# Patient Record
Sex: FEMALE | Race: WHITE | Employment: OTHER | URBAN - METROPOLITAN AREA
[De-identification: names, ages, dates, MRNs, and addresses within clinical notes are randomized per-mention and may not be internally consistent; named-entity substitution may affect disease eponyms.]

---

## 2017-03-27 RX ORDER — VALSARTAN 160 MG/1
TABLET ORAL
Qty: 90 TABLET | Refills: 2 | Status: SHIPPED | OUTPATIENT
Start: 2017-03-27 | End: 2017-03-31

## 2017-03-27 RX ORDER — HYDROCHLOROTHIAZIDE 25 MG/1
TABLET ORAL
Qty: 90 TABLET | Refills: 2 | Status: SHIPPED | OUTPATIENT
Start: 2017-03-27 | End: 2017-03-31

## 2017-03-27 RX ORDER — ATORVASTATIN CALCIUM 20 MG/1
TABLET, FILM COATED ORAL
Qty: 90 TABLET | Refills: 2 | Status: SHIPPED | OUTPATIENT
Start: 2017-03-27 | End: 2017-03-31

## 2017-03-31 ENCOUNTER — OFFICE VISIT (OUTPATIENT)
Dept: FAMILY MEDICINE CLINIC | Age: 77
End: 2017-03-31

## 2017-03-31 VITALS
OXYGEN SATURATION: 98 % | WEIGHT: 198 LBS | BODY MASS INDEX: 32.95 KG/M2 | HEART RATE: 78 BPM | DIASTOLIC BLOOD PRESSURE: 88 MMHG | SYSTOLIC BLOOD PRESSURE: 136 MMHG

## 2017-03-31 DIAGNOSIS — J30.1 SEASONAL ALLERGIC RHINITIS DUE TO POLLEN: ICD-10-CM

## 2017-03-31 DIAGNOSIS — F41.9 ANXIETY: ICD-10-CM

## 2017-03-31 DIAGNOSIS — J45.40 ASTHMA, MODERATE PERSISTENT, WELL-CONTROLLED: ICD-10-CM

## 2017-03-31 DIAGNOSIS — E78.00 HYPERCHOLESTEROLEMIA: ICD-10-CM

## 2017-03-31 DIAGNOSIS — Z91.81 AT RISK FOR FALLING: ICD-10-CM

## 2017-03-31 DIAGNOSIS — Z78.0 POSTMENOPAUSAL: ICD-10-CM

## 2017-03-31 DIAGNOSIS — E03.9 ACQUIRED HYPOTHYROIDISM: ICD-10-CM

## 2017-03-31 DIAGNOSIS — I10 ESSENTIAL HYPERTENSION: Primary | ICD-10-CM

## 2017-03-31 PROCEDURE — 99214 OFFICE O/P EST MOD 30 MIN: CPT | Performed by: FAMILY MEDICINE

## 2017-03-31 RX ORDER — FLUTICASONE PROPIONATE 50 MCG
SPRAY, SUSPENSION (ML) NASAL
Qty: 1 BOTTLE | Refills: 11 | Status: SHIPPED | OUTPATIENT
Start: 2017-03-31 | End: 2018-10-05 | Stop reason: SDUPTHER

## 2017-03-31 RX ORDER — CITALOPRAM 40 MG/1
TABLET ORAL
Qty: 90 TABLET | Refills: 3 | Status: SHIPPED | OUTPATIENT
Start: 2017-03-31 | End: 2018-10-05

## 2017-03-31 RX ORDER — LEVOTHYROXINE SODIUM 0.07 MG/1
TABLET ORAL
Qty: 90 TABLET | Refills: 3 | Status: SHIPPED | OUTPATIENT
Start: 2017-03-31 | End: 2018-04-19 | Stop reason: SDUPTHER

## 2017-03-31 RX ORDER — ALBUTEROL SULFATE 90 UG/1
2 AEROSOL, METERED RESPIRATORY (INHALATION) EVERY 6 HOURS PRN
Qty: 1 INHALER | Refills: 11 | Status: SHIPPED | OUTPATIENT
Start: 2017-03-31 | End: 2018-10-05 | Stop reason: SDUPTHER

## 2017-03-31 RX ORDER — ZOLPIDEM TARTRATE 10 MG/1
10 TABLET ORAL NIGHTLY PRN
Qty: 30 TABLET | Refills: 5 | Status: SHIPPED | OUTPATIENT
Start: 2017-03-31 | End: 2017-12-08

## 2017-04-22 ENCOUNTER — HOSPITAL ENCOUNTER (OUTPATIENT)
Dept: OTHER | Age: 77
Discharge: HOME | End: 2017-04-22
Attending: FAMILY MEDICINE | Admitting: FAMILY MEDICINE

## 2017-04-22 DIAGNOSIS — I10 ESSENTIAL HYPERTENSION: ICD-10-CM

## 2017-04-22 DIAGNOSIS — E03.9 ACQUIRED HYPOTHYROIDISM: ICD-10-CM

## 2017-04-22 LAB
A/G RATIO: 1.5 (ref 1.1–2.2)
ALBUMIN SERPL-MCNC: 4.2 G/DL (ref 3.4–5)
ALP BLD-CCNC: 88 U/L (ref 40–129)
ALT SERPL-CCNC: 17 U/L (ref 10–40)
ANION GAP SERPL CALCULATED.3IONS-SCNC: 15 MMOL/L (ref 3–16)
AST SERPL-CCNC: 20 U/L (ref 15–37)
BILIRUB SERPL-MCNC: 0.6 MG/DL (ref 0–1)
BUN BLDV-MCNC: 14 MG/DL (ref 7–20)
CALCIUM SERPL-MCNC: 9.8 MG/DL (ref 8.3–10.6)
CHLORIDE BLD-SCNC: 100 MMOL/L (ref 99–110)
CHOLESTEROL, TOTAL: 175 MG/DL (ref 0–199)
CO2: 25 MMOL/L (ref 21–32)
CREAT SERPL-MCNC: 0.8 MG/DL (ref 0.6–1.2)
GFR AFRICAN AMERICAN: >60
GFR NON-AFRICAN AMERICAN: >60
GLOBULIN: 2.8 G/DL
GLUCOSE BLD-MCNC: 92 MG/DL (ref 70–99)
HDLC SERPL-MCNC: 61 MG/DL (ref 40–60)
LDL CHOLESTEROL CALCULATED: 97 MG/DL
POTASSIUM SERPL-SCNC: 3.6 MMOL/L (ref 3.5–5.1)
SODIUM BLD-SCNC: 140 MMOL/L (ref 136–145)
TOTAL PROTEIN: 7 G/DL (ref 6.4–8.2)
TRIGL SERPL-MCNC: 87 MG/DL (ref 0–150)
TSH REFLEX: 1.83 UIU/ML (ref 0.27–4.2)
VLDLC SERPL CALC-MCNC: 17 MG/DL

## 2017-05-16 RX ORDER — ATENOLOL 100 MG/1
TABLET ORAL
Qty: 90 TABLET | Refills: 2 | Status: SHIPPED | OUTPATIENT
Start: 2017-05-16 | End: 2017-12-08

## 2017-06-02 ENCOUNTER — HOSPITAL ENCOUNTER (OUTPATIENT)
Dept: GENERAL RADIOLOGY | Age: 77
Discharge: HOME | End: 2017-06-02
Attending: FAMILY MEDICINE | Admitting: FAMILY MEDICINE

## 2017-06-02 DIAGNOSIS — Z78.0 ASYMPTOMATIC MENOPAUSAL STATE: ICD-10-CM

## 2017-06-02 DIAGNOSIS — Z78.0 POSTMENOPAUSAL: ICD-10-CM

## 2017-06-07 ENCOUNTER — TELEPHONE (OUTPATIENT)
Dept: FAMILY MEDICINE CLINIC | Age: 77
End: 2017-06-07

## 2017-06-07 RX ORDER — ALENDRONATE SODIUM 70 MG/1
70 TABLET ORAL
Qty: 4 TABLET | Refills: 12 | Status: SHIPPED | OUTPATIENT
Start: 2017-06-07 | End: 2018-10-05

## 2017-06-21 ENCOUNTER — TELEPHONE (OUTPATIENT)
Dept: FAMILY MEDICINE CLINIC | Age: 77
End: 2017-06-21

## 2017-12-08 ENCOUNTER — TELEPHONE (OUTPATIENT)
Dept: FAMILY MEDICINE CLINIC | Age: 77
End: 2017-12-08

## 2017-12-08 ENCOUNTER — OFFICE VISIT (OUTPATIENT)
Dept: FAMILY MEDICINE CLINIC | Age: 77
End: 2017-12-08

## 2017-12-08 VITALS
WEIGHT: 199 LBS | DIASTOLIC BLOOD PRESSURE: 80 MMHG | HEART RATE: 80 BPM | BODY MASS INDEX: 33.12 KG/M2 | OXYGEN SATURATION: 97 % | SYSTOLIC BLOOD PRESSURE: 122 MMHG

## 2017-12-08 DIAGNOSIS — R44.0 AUDITORY HALLUCINATION: ICD-10-CM

## 2017-12-08 DIAGNOSIS — R41.0 DELIRIUM: Primary | ICD-10-CM

## 2017-12-08 PROBLEM — E66.9 OBESITY (BMI 30-39.9): Status: ACTIVE | Noted: 2017-12-08

## 2017-12-08 PROBLEM — G93.40 ACUTE ENCEPHALOPATHY: Status: ACTIVE | Noted: 2017-12-08

## 2017-12-08 PROCEDURE — G8484 FLU IMMUNIZE NO ADMIN: HCPCS | Performed by: FAMILY MEDICINE

## 2017-12-08 PROCEDURE — 1036F TOBACCO NON-USER: CPT | Performed by: FAMILY MEDICINE

## 2017-12-08 PROCEDURE — 1090F PRES/ABSN URINE INCON ASSESS: CPT | Performed by: FAMILY MEDICINE

## 2017-12-08 PROCEDURE — G8417 CALC BMI ABV UP PARAM F/U: HCPCS | Performed by: FAMILY MEDICINE

## 2017-12-08 PROCEDURE — 4040F PNEUMOC VAC/ADMIN/RCVD: CPT | Performed by: FAMILY MEDICINE

## 2017-12-08 PROCEDURE — G8399 PT W/DXA RESULTS DOCUMENT: HCPCS | Performed by: FAMILY MEDICINE

## 2017-12-08 PROCEDURE — 1123F ACP DISCUSS/DSCN MKR DOCD: CPT | Performed by: FAMILY MEDICINE

## 2017-12-08 PROCEDURE — 99214 OFFICE O/P EST MOD 30 MIN: CPT | Performed by: FAMILY MEDICINE

## 2017-12-08 PROCEDURE — G8428 CUR MEDS NOT DOCUMENT: HCPCS | Performed by: FAMILY MEDICINE

## 2017-12-08 RX ORDER — VALSARTAN 160 MG/1
160 TABLET ORAL DAILY
Qty: 90 TABLET | Refills: 3 | Status: SHIPPED | OUTPATIENT
Start: 2017-12-08 | End: 2018-08-01 | Stop reason: ALTCHOICE

## 2017-12-08 RX ORDER — ATENOLOL 50 MG/1
TABLET ORAL
COMMUNITY
Start: 2017-11-13 | End: 2017-12-08 | Stop reason: SDUPTHER

## 2017-12-08 RX ORDER — ATORVASTATIN CALCIUM 20 MG/1
20 TABLET, FILM COATED ORAL DAILY
Qty: 90 TABLET | Refills: 3 | Status: SHIPPED | OUTPATIENT
Start: 2017-12-08 | End: 2018-10-05 | Stop reason: SDUPTHER

## 2017-12-08 RX ORDER — HYDROCHLOROTHIAZIDE 25 MG/1
25 TABLET ORAL DAILY
Qty: 90 TABLET | Refills: 3 | Status: SHIPPED | OUTPATIENT
Start: 2017-12-08 | End: 2018-10-05 | Stop reason: SDUPTHER

## 2017-12-08 RX ORDER — ATENOLOL 50 MG/1
50 TABLET ORAL DAILY
Qty: 90 TABLET | Refills: 3 | Status: SHIPPED | OUTPATIENT
Start: 2017-12-08 | End: 2018-04-26 | Stop reason: SDUPTHER

## 2017-12-08 NOTE — PROGRESS NOTES
Subjective:      Patient ID: Erin Booth is a 69 y.o. female. HPI patient presents today for her annual check up, and to review medications. Pt here with grandson who reports past 3 weeks has been more confused, talking to people who aren't there, thinking people are out to get her. Last night at midnight she walked down the street to her son's house, said that someone called her to get out as they were going to \"smoke\" her. She says that 10 years ago the neighbors were listening in on her phone and they didn't like what she said so they are getting back at her now. Last night she says that her son was outside waiting for her but grandson reports they talked to him and he never talked to her. Grandson reports the other day pt asked him to come over as the neighbor wanted to see him but the neighbor she was talking about no longer lives there and the house is actually empty. Grandson reports has not been eating as well past few weeks. Pt denies any CP, abd pain, n/v. States sometimes feels constipated but dif to get exact history. No diarrhea. No dysuria. No fever. No cough. + chronic RN. States has been more sob but not taking inhalers reg (pt very non compliant with these chronically). Review of Systems    Objective:   Physical Exam  Vitals:    12/08/17 1148 12/08/17 1221   BP: 122/80    Site: Left Arm    Position: Sitting    Cuff Size: Large Adult    Pulse: 105 80   SpO2: 97%    Weight: 199 lb (90.3 kg)      Wt Readings from Last 3 Encounters:   12/08/17 199 lb (90.3 kg)   03/31/17 198 lb (89.8 kg)   09/27/16 202 lb 3.2 oz (91.7 kg)     Body mass index is 33.12 kg/m². Alert and oriented x 4 NAD, obese, well hydrated, well developed.   Knows month, day year and date  Knows here to discuss her mental status but states \"I;m not crazy\"  VERY tangential in stories  PERRL, EOMI  TM clear bilaterally  OP clear  No nodes neck  Lungs clear  CV rrr  abd soft, NT, ND  Non pitting edema R>L      Assessment: Fredy Baig was seen today for altered mental status. Diagnoses and all orders for this visit:    Delirium  Auditory hallucination    Concern with acute change in mental status  Attempt to get urine in office but pt missed the hat in the toilet  Discussed with hospitalist at [facility]  Will admit for further eval    Other orders  -     hydrochlorothiazide (HYDRODIURIL) 25 MG tablet; Take 1 tablet by mouth daily  -     atorvastatin (LIPITOR) 20 MG tablet; Take 1 tablet by mouth daily  -     valsartan (DIOVAN) 160 MG tablet; Take 1 tablet by mouth daily  -     atenolol (TENORMIN) 50 MG tablet;  Take 1 tablet by mouth daily

## 2017-12-10 PROBLEM — F20.9 SCHIZOPHRENIA (HCC): Status: ACTIVE | Noted: 2017-12-10

## 2017-12-11 ENCOUNTER — CARE COORDINATION (OUTPATIENT)
Dept: CASE MANAGEMENT | Age: 77
End: 2017-12-11

## 2017-12-11 NOTE — CARE COORDINATION
[facility] Transitions Initial Follow Up Call    Call within 2 business days of discharge: Yes    Patient: Sue Alvarado Patient : 1940   MRN: [id number]  Reason for Admission: Schizophrenia  Discharge Date: 12/10/17 RARS: Geisinger Risk Score: 11.5       Facility: [place] [facility] [address] attempted 24 hour discharge call, no answer and voice mail is not set up, unable to leave message. Attempted to reach family member on [phone], no answer. Left message for family member to return call. Follow Up  No future appointments.     Alicia Crocker, RN   Care Transition Coordinator  [phone]

## 2017-12-13 NOTE — CARE COORDINATION
[facility] Transitions Initial Follow Up Call    Call within 2 business days of discharge: Yes    Patient: Eric Castano Patient : 1940   MRN: [id number]  Reason for Admission: Schizophrenia  Discharge Date: 12/10/17 RARS: Geisinger Risk Score: 11.5    Facility: [facility] [address] services provided:  Obtained and reviewed discharge summary and/or continuity of care documents       CTC made 3rd and final attempt to reach patient for 24 hour follow up call. No answer,   Episode closed. Follow Up  No future appointments.     Jorge Carney RN

## 2018-04-19 RX ORDER — LEVOTHYROXINE SODIUM 0.07 MG/1
TABLET ORAL
Qty: 90 TABLET | Refills: 0 | Status: SHIPPED | OUTPATIENT
Start: 2018-04-19 | End: 2018-10-05

## 2018-04-26 RX ORDER — ATENOLOL 50 MG/1
50 TABLET ORAL DAILY
Qty: 90 TABLET | Refills: 3 | Status: SHIPPED | OUTPATIENT
Start: 2018-04-26 | End: 2019-05-07 | Stop reason: SDUPTHER

## 2018-08-01 ENCOUNTER — TELEPHONE (OUTPATIENT)
Dept: FAMILY MEDICINE CLINIC | Age: 78
End: 2018-08-01

## 2018-08-01 RX ORDER — TELMISARTAN 40 MG/1
40 TABLET ORAL DAILY
Qty: 90 TABLET | Refills: 3 | Status: SHIPPED | OUTPATIENT
Start: 2018-08-01 | End: 2019-08-04 | Stop reason: SDUPTHER

## 2018-08-01 NOTE — TELEPHONE ENCOUNTER
Pharmacy fax received, requesting a change in [facility] due to Valsartan being out of stock. Patient is not currently taking the recalled brand, but they are completely out of the medication. Please advise of alternative.

## 2018-10-05 ENCOUNTER — OFFICE VISIT (OUTPATIENT)
Dept: FAMILY MEDICINE CLINIC | Age: 78
End: 2018-10-05
Payer: MEDICARE

## 2018-10-05 VITALS
SYSTOLIC BLOOD PRESSURE: 112 MMHG | OXYGEN SATURATION: 97 % | HEART RATE: 68 BPM | BODY MASS INDEX: 28.84 KG/M2 | DIASTOLIC BLOOD PRESSURE: 80 MMHG | WEIGHT: 168 LBS

## 2018-10-05 DIAGNOSIS — E78.00 HYPERCHOLESTEROLEMIA: ICD-10-CM

## 2018-10-05 DIAGNOSIS — F20.81 SCHIZOPHRENIFORM DISORDER (HCC): ICD-10-CM

## 2018-10-05 DIAGNOSIS — E03.9 ACQUIRED HYPOTHYROIDISM: ICD-10-CM

## 2018-10-05 DIAGNOSIS — Z23 NEED FOR INFLUENZA VACCINATION: ICD-10-CM

## 2018-10-05 DIAGNOSIS — I10 ESSENTIAL HYPERTENSION: Primary | ICD-10-CM

## 2018-10-05 DIAGNOSIS — M85.851 OSTEOPENIA OF NECKS OF BOTH FEMURS: ICD-10-CM

## 2018-10-05 DIAGNOSIS — J45.40 ASTHMA, MODERATE PERSISTENT, WELL-CONTROLLED: ICD-10-CM

## 2018-10-05 DIAGNOSIS — L30.9 DERMATITIS: ICD-10-CM

## 2018-10-05 DIAGNOSIS — M85.852 OSTEOPENIA OF NECKS OF BOTH FEMURS: ICD-10-CM

## 2018-10-05 PROBLEM — F20.9 SCHIZOPHRENIA (HCC): Status: RESOLVED | Noted: 2017-12-10 | Resolved: 2018-10-05

## 2018-10-05 PROBLEM — G93.40 ACUTE ENCEPHALOPATHY: Status: RESOLVED | Noted: 2017-12-08 | Resolved: 2018-10-05

## 2018-10-05 PROCEDURE — 1123F ACP DISCUSS/DSCN MKR DOCD: CPT | Performed by: FAMILY MEDICINE

## 2018-10-05 PROCEDURE — G8399 PT W/DXA RESULTS DOCUMENT: HCPCS | Performed by: FAMILY MEDICINE

## 2018-10-05 PROCEDURE — 99214 OFFICE O/P EST MOD 30 MIN: CPT | Performed by: FAMILY MEDICINE

## 2018-10-05 PROCEDURE — 1090F PRES/ABSN URINE INCON ASSESS: CPT | Performed by: FAMILY MEDICINE

## 2018-10-05 PROCEDURE — G8482 FLU IMMUNIZE ORDER/ADMIN: HCPCS | Performed by: FAMILY MEDICINE

## 2018-10-05 PROCEDURE — 90662 IIV NO PRSV INCREASED AG IM: CPT | Performed by: FAMILY MEDICINE

## 2018-10-05 PROCEDURE — G8427 DOCREV CUR MEDS BY ELIG CLIN: HCPCS | Performed by: FAMILY MEDICINE

## 2018-10-05 PROCEDURE — 4040F PNEUMOC VAC/ADMIN/RCVD: CPT | Performed by: FAMILY MEDICINE

## 2018-10-05 PROCEDURE — 1036F TOBACCO NON-USER: CPT | Performed by: FAMILY MEDICINE

## 2018-10-05 PROCEDURE — G0008 ADMIN INFLUENZA VIRUS VAC: HCPCS | Performed by: FAMILY MEDICINE

## 2018-10-05 PROCEDURE — G8417 CALC BMI ABV UP PARAM F/U: HCPCS | Performed by: FAMILY MEDICINE

## 2018-10-05 PROCEDURE — 1101F PT FALLS ASSESS-DOCD LE1/YR: CPT | Performed by: FAMILY MEDICINE

## 2018-10-05 RX ORDER — TRIAMCINOLONE ACETONIDE 1 MG/G
CREAM TOPICAL
Qty: 60 G | Refills: 1 | Status: SHIPPED | OUTPATIENT
Start: 2018-10-05 | End: 2019-09-19 | Stop reason: CLARIF

## 2018-10-05 RX ORDER — ALBUTEROL SULFATE 90 UG/1
2 AEROSOL, METERED RESPIRATORY (INHALATION) EVERY 6 HOURS PRN
Qty: 1 INHALER | Refills: 11 | Status: SHIPPED | OUTPATIENT
Start: 2018-10-05 | End: 2019-09-19 | Stop reason: SDUPTHER

## 2018-10-05 RX ORDER — FLUTICASONE PROPIONATE 50 MCG
SPRAY, SUSPENSION (ML) NASAL
Qty: 1 BOTTLE | Refills: 11 | Status: SHIPPED | OUTPATIENT
Start: 2018-10-05 | End: 2019-09-19 | Stop reason: SINTOL

## 2018-10-05 RX ORDER — ATORVASTATIN CALCIUM 20 MG/1
20 TABLET, FILM COATED ORAL DAILY
Qty: 90 TABLET | Refills: 3 | Status: SHIPPED | OUTPATIENT
Start: 2018-10-05 | End: 2019-09-19 | Stop reason: SDUPTHER

## 2018-10-05 RX ORDER — ALENDRONATE SODIUM 70 MG/1
70 TABLET ORAL
Qty: 4 TABLET | Refills: 12 | Status: CANCELLED | OUTPATIENT
Start: 2018-10-05

## 2018-10-05 RX ORDER — HYDROCHLOROTHIAZIDE 25 MG/1
25 TABLET ORAL DAILY
Qty: 90 TABLET | Refills: 3 | Status: SHIPPED | OUTPATIENT
Start: 2018-10-05 | End: 2019-09-19 | Stop reason: SDUPTHER

## 2018-10-05 ASSESSMENT — PATIENT HEALTH QUESTIONNAIRE - PHQ9
SUM OF ALL RESPONSES TO PHQ QUESTIONS 1-9: 0
SUM OF ALL RESPONSES TO PHQ QUESTIONS 1-9: 0
SUM OF ALL RESPONSES TO PHQ9 QUESTIONS 1 & 2: 0
1. LITTLE INTEREST OR PLEASURE IN DOING THINGS: 0
2. FEELING DOWN, DEPRESSED OR HOPELESS: 0

## 2019-01-15 RX ORDER — HYDROCHLOROTHIAZIDE 25 MG/1
TABLET ORAL
Qty: 90 TABLET | Refills: 2 | OUTPATIENT
Start: 2019-01-15

## 2019-01-24 ENCOUNTER — HOSPITAL ENCOUNTER (EMERGENCY)
Age: 79
Discharge: HOME | End: 2019-01-24
Payer: MEDICARE

## 2019-01-24 ENCOUNTER — APPOINTMENT (OUTPATIENT)
Dept: CT IMAGING | Age: 79
End: 2019-01-24
Payer: MEDICARE

## 2019-01-24 VITALS
SYSTOLIC BLOOD PRESSURE: 154 MMHG | BODY MASS INDEX: 28.49 KG/M2 | TEMPERATURE: 98.1 F | DIASTOLIC BLOOD PRESSURE: 75 MMHG | OXYGEN SATURATION: 96 % | HEART RATE: 71 BPM | WEIGHT: 166 LBS | RESPIRATION RATE: 18 BRPM

## 2019-01-24 DIAGNOSIS — K62.5 BRBPR (BRIGHT RED BLOOD PER RECTUM): Primary | ICD-10-CM

## 2019-01-24 DIAGNOSIS — K59.00 CONSTIPATION, UNSPECIFIED CONSTIPATION TYPE: ICD-10-CM

## 2019-01-24 DIAGNOSIS — K64.9 HEMORRHOIDS, UNSPECIFIED HEMORRHOID TYPE: ICD-10-CM

## 2019-01-24 LAB
A/G RATIO: 1.4 (ref 1.1–2.2)
ABO/RH: NORMAL
ALBUMIN SERPL-MCNC: 4.2 G/DL (ref 3.4–5)
ALP BLD-CCNC: 90 U/L (ref 40–129)
ALT SERPL-CCNC: 33 U/L (ref 10–40)
ANION GAP SERPL CALCULATED.3IONS-SCNC: 9 MMOL/L (ref 3–16)
ANTIBODY SCREEN: NORMAL
APTT: 32.7 SEC (ref 26–36)
AST SERPL-CCNC: 28 U/L (ref 15–37)
BASOPHILS ABSOLUTE: 0.1 K/UL (ref 0–0.2)
BASOPHILS RELATIVE PERCENT: 1.2 %
BILIRUB SERPL-MCNC: 0.3 MG/DL (ref 0–1)
BILIRUBIN URINE: NEGATIVE
BLOOD, URINE: NEGATIVE
BUN BLDV-MCNC: 24 MG/DL (ref 7–20)
CALCIUM SERPL-MCNC: 10.5 MG/DL (ref 8.3–10.6)
CHLORIDE BLD-SCNC: 99 MMOL/L (ref 99–110)
CLARITY: CLEAR
CO2: 29 MMOL/L (ref 21–32)
COLOR: YELLOW
CREAT SERPL-MCNC: 0.9 MG/DL (ref 0.6–1.2)
EOSINOPHILS ABSOLUTE: 0.1 K/UL (ref 0–0.6)
EOSINOPHILS RELATIVE PERCENT: 2.7 %
EPITHELIAL CELLS, UA: 1 /HPF (ref 0–5)
GFR AFRICAN AMERICAN: >60
GFR NON-AFRICAN AMERICAN: >60
GLOBULIN: 3 G/DL
GLUCOSE BLD-MCNC: 117 MG/DL (ref 70–99)
GLUCOSE URINE: NEGATIVE MG/DL
HCT VFR BLD CALC: 44.3 % (ref 36–48)
HEMOGLOBIN: 14.3 G/DL (ref 12–16)
HYALINE CASTS: 1 /LPF (ref 0–8)
INR BLD: 0.96 (ref 0.86–1.14)
KETONES, URINE: NEGATIVE MG/DL
LACTIC ACID: 0.9 MMOL/L (ref 0.4–2)
LEUKOCYTE ESTERASE, URINE: ABNORMAL
LYMPHOCYTES ABSOLUTE: 1.4 K/UL (ref 1–5.1)
LYMPHOCYTES RELATIVE PERCENT: 30.7 %
MCH RBC QN AUTO: 29.2 PG (ref 26–34)
MCHC RBC AUTO-ENTMCNC: 32.3 G/DL (ref 31–36)
MCV RBC AUTO: 90.4 FL (ref 80–100)
MICROSCOPIC EXAMINATION: YES
MONOCYTES ABSOLUTE: 0.5 K/UL (ref 0–1.3)
MONOCYTES RELATIVE PERCENT: 10 %
NEUTROPHILS ABSOLUTE: 2.5 K/UL (ref 1.7–7.7)
NEUTROPHILS RELATIVE PERCENT: 55.4 %
NITRITE, URINE: NEGATIVE
OCCULT BLOOD DIAGNOSTIC: NORMAL
PDW BLD-RTO: 14.1 % (ref 12.4–15.4)
PH UA: 6
PLATELET # BLD: 250 K/UL (ref 135–450)
PMV BLD AUTO: 8.2 FL (ref 5–10.5)
POTASSIUM REFLEX MAGNESIUM: 4.6 MMOL/L (ref 3.5–5.1)
PROTEIN UA: NEGATIVE MG/DL
PROTHROMBIN TIME: 10.9 SEC (ref 9.8–13)
RBC # BLD: 4.91 M/UL (ref 4–5.2)
RBC UA: 0 /HPF (ref 0–4)
REASON FOR REJECTION: NORMAL
REJECTED TEST: NORMAL
SODIUM BLD-SCNC: 137 MMOL/L (ref 136–145)
SPECIFIC GRAVITY UA: 1.02
TOTAL PROTEIN: 7.2 G/DL (ref 6.4–8.2)
URINE REFLEX TO CULTURE: YES
URINE TYPE: ABNORMAL
UROBILINOGEN, URINE: 0.2 E.U./DL
WBC # BLD: 4.6 K/UL (ref 4–11)
WBC UA: 4 /HPF (ref 0–5)

## 2019-01-24 PROCEDURE — 96360 HYDRATION IV INFUSION INIT: CPT

## 2019-01-24 PROCEDURE — 86850 RBC ANTIBODY SCREEN: CPT

## 2019-01-24 PROCEDURE — G0328 FECAL BLOOD SCRN IMMUNOASSAY: HCPCS

## 2019-01-24 PROCEDURE — 2580000003 HC RX 258: Performed by: PHYSICIAN ASSISTANT

## 2019-01-24 PROCEDURE — 80053 COMPREHEN METABOLIC PANEL: CPT

## 2019-01-24 PROCEDURE — 85025 COMPLETE CBC W/AUTO DIFF WBC: CPT

## 2019-01-24 PROCEDURE — 81001 URINALYSIS AUTO W/SCOPE: CPT

## 2019-01-24 PROCEDURE — 87086 URINE CULTURE/COLONY COUNT: CPT

## 2019-01-24 PROCEDURE — 85610 PROTHROMBIN TIME: CPT

## 2019-01-24 PROCEDURE — 86900 BLOOD TYPING SEROLOGIC ABO: CPT

## 2019-01-24 PROCEDURE — 83605 ASSAY OF LACTIC ACID: CPT

## 2019-01-24 PROCEDURE — 86901 BLOOD TYPING SEROLOGIC RH(D): CPT

## 2019-01-24 PROCEDURE — 99283 EMERGENCY DEPT VISIT LOW MDM: CPT

## 2019-01-24 PROCEDURE — 74177 CT ABD & PELVIS W/CONTRAST: CPT

## 2019-01-24 PROCEDURE — 6360000004 HC RX CONTRAST MEDICATION: Performed by: PHYSICIAN ASSISTANT

## 2019-01-24 PROCEDURE — 85730 THROMBOPLASTIN TIME PARTIAL: CPT

## 2019-01-24 RX ORDER — 0.9 % SODIUM CHLORIDE 0.9 %
1000 INTRAVENOUS SOLUTION INTRAVENOUS ONCE
Status: COMPLETED | OUTPATIENT
Start: 2019-01-24 | End: 2019-01-24

## 2019-01-24 RX ADMIN — SODIUM CHLORIDE 1000 ML: 9 INJECTION, SOLUTION INTRAVENOUS at 17:57

## 2019-01-24 RX ADMIN — IOPAMIDOL 75 ML: 755 INJECTION, SOLUTION INTRAVENOUS at 17:51

## 2019-01-24 ASSESSMENT — ENCOUNTER SYMPTOMS
DIARRHEA: 0
NAUSEA: 0
SHORTNESS OF BREATH: 0
CONSTIPATION: 1
VOMITING: 0
COUGH: 0
RHINORRHEA: 0
ABDOMINAL PAIN: 0
BLOOD IN STOOL: 1

## 2019-01-24 ASSESSMENT — PAIN DESCRIPTION - LOCATION: LOCATION: ABDOMEN

## 2019-01-24 ASSESSMENT — PAIN SCALES - GENERAL: PAINLEVEL_OUTOF10: 3

## 2019-01-24 ASSESSMENT — PAIN DESCRIPTION - PAIN TYPE: TYPE: ACUTE PAIN

## 2019-01-26 LAB — URINE CULTURE, ROUTINE: NORMAL

## 2019-03-05 ENCOUNTER — TELEPHONE (OUTPATIENT)
Dept: FAMILY MEDICINE CLINIC | Age: 79
End: 2019-03-05

## 2019-05-07 RX ORDER — ATENOLOL 50 MG/1
TABLET ORAL
Qty: 90 TABLET | Refills: 0 | Status: SHIPPED | OUTPATIENT
Start: 2019-05-07 | End: 2019-08-04 | Stop reason: SDUPTHER

## 2019-08-05 RX ORDER — ATENOLOL 50 MG/1
TABLET ORAL
Qty: 30 TABLET | Refills: 0 | Status: SHIPPED | OUTPATIENT
Start: 2019-08-05 | End: 2019-08-29 | Stop reason: SDUPTHER

## 2019-08-05 RX ORDER — TELMISARTAN 40 MG/1
TABLET ORAL
Qty: 30 TABLET | Refills: 0 | Status: SHIPPED | OUTPATIENT
Start: 2019-08-05 | End: 2019-09-19 | Stop reason: SDUPTHER

## 2019-08-29 RX ORDER — ATENOLOL 50 MG/1
TABLET ORAL
Qty: 30 TABLET | Refills: 0 | Status: SHIPPED | OUTPATIENT
Start: 2019-08-29 | End: 2019-09-19 | Stop reason: SDUPTHER

## 2019-09-04 ENCOUNTER — CARE COORDINATION (OUTPATIENT)
Dept: CARE COORDINATION | Age: 79
End: 2019-09-04

## 2019-09-12 ENCOUNTER — HOSPITAL ENCOUNTER (EMERGENCY)
Age: 79
Discharge: PSYCHIATRIC OR SUBSTANCE USE FACILITY | DRG: 885 | End: 2019-09-13
Attending: EMERGENCY MEDICINE | Admitting: FAMILY MEDICINE
Payer: MEDICARE

## 2019-09-12 ENCOUNTER — APPOINTMENT (OUTPATIENT)
Dept: CT IMAGING | Age: 79
DRG: 885 | End: 2019-09-12
Payer: MEDICARE

## 2019-09-12 ENCOUNTER — APPOINTMENT (OUTPATIENT)
Dept: GENERAL RADIOLOGY | Age: 79
DRG: 885 | End: 2019-09-12
Payer: MEDICARE

## 2019-09-12 DIAGNOSIS — F41.1 ANXIETY STATE: ICD-10-CM

## 2019-09-12 DIAGNOSIS — R44.3 HALLUCINATIONS: Primary | ICD-10-CM

## 2019-09-12 DIAGNOSIS — F20.3 UNDIFFERENTIATED SCHIZOPHRENIA (HCC): ICD-10-CM

## 2019-09-12 DIAGNOSIS — F22 PARANOIA (HCC): ICD-10-CM

## 2019-09-12 PROBLEM — F23 ACUTE PSYCHOSIS (HCC): Status: ACTIVE | Noted: 2019-09-12

## 2019-09-12 LAB
A/G RATIO: 1.6 (ref 1.1–2.2)
ACETAMINOPHEN LEVEL: <5 UG/ML (ref 10–30)
ALBUMIN SERPL-MCNC: 4.3 G/DL (ref 3.4–5)
ALP BLD-CCNC: 83 U/L (ref 40–129)
ALT SERPL-CCNC: 14 U/L (ref 10–40)
AMPHETAMINE SCREEN, URINE: NORMAL
ANION GAP SERPL CALCULATED.3IONS-SCNC: 9 MMOL/L (ref 3–16)
AST SERPL-CCNC: 24 U/L (ref 15–37)
BARBITURATE SCREEN URINE: NORMAL
BASOPHILS ABSOLUTE: 0 K/UL (ref 0–0.2)
BASOPHILS RELATIVE PERCENT: 0.8 %
BENZODIAZEPINE SCREEN, URINE: NORMAL
BILIRUB SERPL-MCNC: 0.5 MG/DL (ref 0–1)
BILIRUBIN URINE: NEGATIVE
BLOOD, URINE: NEGATIVE
BUN BLDV-MCNC: 18 MG/DL (ref 7–20)
CALCIUM SERPL-MCNC: 10.2 MG/DL (ref 8.3–10.6)
CANNABINOID SCREEN URINE: NORMAL
CHLORIDE BLD-SCNC: 105 MMOL/L (ref 99–110)
CLARITY: CLEAR
CO2: 26 MMOL/L (ref 21–32)
COCAINE METABOLITE SCREEN URINE: NORMAL
COLOR: YELLOW
CREAT SERPL-MCNC: 0.8 MG/DL (ref 0.6–1.2)
EOSINOPHILS ABSOLUTE: 0.1 K/UL (ref 0–0.6)
EOSINOPHILS RELATIVE PERCENT: 2.5 %
ETHANOL: NORMAL MG/DL (ref 0–0.08)
GFR AFRICAN AMERICAN: >60
GFR NON-AFRICAN AMERICAN: >60
GLOBULIN: 2.7 G/DL
GLUCOSE BLD-MCNC: 98 MG/DL (ref 70–99)
GLUCOSE URINE: NEGATIVE MG/DL
HCT VFR BLD CALC: 39.5 % (ref 36–48)
HEMOGLOBIN: 13 G/DL (ref 12–16)
KETONES, URINE: NEGATIVE MG/DL
LEUKOCYTE ESTERASE, URINE: NEGATIVE
LYMPHOCYTES ABSOLUTE: 1.5 K/UL (ref 1–5.1)
LYMPHOCYTES RELATIVE PERCENT: 33.3 %
Lab: NORMAL
MCH RBC QN AUTO: 29.6 PG (ref 26–34)
MCHC RBC AUTO-ENTMCNC: 32.8 G/DL (ref 31–36)
MCV RBC AUTO: 90.2 FL (ref 80–100)
METHADONE SCREEN, URINE: NORMAL
MICROSCOPIC EXAMINATION: NORMAL
MONOCYTES ABSOLUTE: 0.6 K/UL (ref 0–1.3)
MONOCYTES RELATIVE PERCENT: 12.8 %
NEUTROPHILS ABSOLUTE: 2.3 K/UL (ref 1.7–7.7)
NEUTROPHILS RELATIVE PERCENT: 50.6 %
NITRITE, URINE: NEGATIVE
OPIATE SCREEN URINE: NORMAL
OXYCODONE URINE: NORMAL
PDW BLD-RTO: 13.5 % (ref 12.4–15.4)
PH UA: 5.5
PH UA: 5.5 (ref 5–8)
PHENCYCLIDINE SCREEN URINE: NORMAL
PLATELET # BLD: 189 K/UL (ref 135–450)
PMV BLD AUTO: 8.8 FL (ref 5–10.5)
POTASSIUM REFLEX MAGNESIUM: 4.1 MMOL/L (ref 3.5–5.1)
PRO-BNP: 553 PG/ML (ref 0–449)
PROPOXYPHENE SCREEN: NORMAL
PROTEIN UA: NEGATIVE MG/DL
RBC # BLD: 4.38 M/UL (ref 4–5.2)
SALICYLATE, SERUM: <0.3 MG/DL (ref 15–30)
SODIUM BLD-SCNC: 140 MMOL/L (ref 136–145)
SPECIFIC GRAVITY UA: 1.01 (ref 1–1.03)
TOTAL PROTEIN: 7 G/DL (ref 6.4–8.2)
TROPONIN: <0.01 NG/ML
URINE REFLEX TO CULTURE: NORMAL
URINE TYPE: NORMAL
UROBILINOGEN, URINE: 0.2 E.U./DL
WBC # BLD: 4.5 K/UL (ref 4–11)

## 2019-09-12 PROCEDURE — 1200000000 HC SEMI PRIVATE

## 2019-09-12 PROCEDURE — 84484 ASSAY OF TROPONIN QUANT: CPT

## 2019-09-12 PROCEDURE — 70450 CT HEAD/BRAIN W/O DYE: CPT

## 2019-09-12 PROCEDURE — G0480 DRUG TEST DEF 1-7 CLASSES: HCPCS

## 2019-09-12 PROCEDURE — 83880 ASSAY OF NATRIURETIC PEPTIDE: CPT

## 2019-09-12 PROCEDURE — 80307 DRUG TEST PRSMV CHEM ANLYZR: CPT

## 2019-09-12 PROCEDURE — 6360000002 HC RX W HCPCS: Performed by: PHYSICIAN ASSISTANT

## 2019-09-12 PROCEDURE — 81003 URINALYSIS AUTO W/O SCOPE: CPT

## 2019-09-12 PROCEDURE — 93005 ELECTROCARDIOGRAM TRACING: CPT | Performed by: PHYSICIAN ASSISTANT

## 2019-09-12 PROCEDURE — 85025 COMPLETE CBC W/AUTO DIFF WBC: CPT

## 2019-09-12 PROCEDURE — 99285 EMERGENCY DEPT VISIT HI MDM: CPT

## 2019-09-12 PROCEDURE — 80053 COMPREHEN METABOLIC PANEL: CPT

## 2019-09-12 PROCEDURE — 71045 X-RAY EXAM CHEST 1 VIEW: CPT

## 2019-09-12 PROCEDURE — 96374 THER/PROPH/DIAG INJ IV PUSH: CPT

## 2019-09-12 RX ORDER — LORAZEPAM 2 MG/ML
1 INJECTION INTRAMUSCULAR ONCE
Status: COMPLETED | OUTPATIENT
Start: 2019-09-12 | End: 2019-09-12

## 2019-09-12 RX ADMIN — LORAZEPAM 1 MG: 2 INJECTION INTRAMUSCULAR; INTRAVENOUS at 18:19

## 2019-09-13 ENCOUNTER — HOSPITAL ENCOUNTER (INPATIENT)
Age: 79
LOS: 4 days | Discharge: HOME | DRG: 885 | End: 2019-09-17
Attending: PSYCHIATRY & NEUROLOGY | Admitting: PSYCHIATRY & NEUROLOGY
Payer: MEDICARE

## 2019-09-13 VITALS
SYSTOLIC BLOOD PRESSURE: 144 MMHG | DIASTOLIC BLOOD PRESSURE: 61 MMHG | HEART RATE: 68 BPM | TEMPERATURE: 98.6 F | BODY MASS INDEX: 29.32 KG/M2 | WEIGHT: 176 LBS | RESPIRATION RATE: 21 BRPM | HEIGHT: 65 IN | OXYGEN SATURATION: 97 %

## 2019-09-13 PROBLEM — J44.9 COPD (CHRONIC OBSTRUCTIVE PULMONARY DISEASE) (HCC): Status: ACTIVE | Noted: 2019-09-13

## 2019-09-13 LAB
EKG ATRIAL RATE: 61 BPM
EKG ATRIAL RATE: 62 BPM
EKG DIAGNOSIS: NORMAL
EKG DIAGNOSIS: NORMAL
EKG P AXIS: 31 DEGREES
EKG P AXIS: 68 DEGREES
EKG P-R INTERVAL: 196 MS
EKG P-R INTERVAL: 204 MS
EKG Q-T INTERVAL: 410 MS
EKG Q-T INTERVAL: 420 MS
EKG QRS DURATION: 80 MS
EKG QRS DURATION: 84 MS
EKG QTC CALCULATION (BAZETT): 412 MS
EKG QTC CALCULATION (BAZETT): 426 MS
EKG R AXIS: 10 DEGREES
EKG R AXIS: 52 DEGREES
EKG T AXIS: 17 DEGREES
EKG T AXIS: 59 DEGREES
EKG VENTRICULAR RATE: 61 BPM
EKG VENTRICULAR RATE: 62 BPM
TSH REFLEX: 3.03 UIU/ML (ref 0.27–4.2)

## 2019-09-13 PROCEDURE — 93010 ELECTROCARDIOGRAM REPORT: CPT | Performed by: INTERNAL MEDICINE

## 2019-09-13 PROCEDURE — 99223 1ST HOSP IP/OBS HIGH 75: CPT | Performed by: NURSE PRACTITIONER

## 2019-09-13 PROCEDURE — 93005 ELECTROCARDIOGRAM TRACING: CPT | Performed by: PSYCHIATRY & NEUROLOGY

## 2019-09-13 PROCEDURE — 97166 OT EVAL MOD COMPLEX 45 MIN: CPT

## 2019-09-13 PROCEDURE — 1240000000 HC EMOTIONAL WELLNESS R&B

## 2019-09-13 PROCEDURE — 97530 THERAPEUTIC ACTIVITIES: CPT

## 2019-09-13 PROCEDURE — 6370000000 HC RX 637 (ALT 250 FOR IP): Performed by: PSYCHIATRY & NEUROLOGY

## 2019-09-13 PROCEDURE — 94640 AIRWAY INHALATION TREATMENT: CPT

## 2019-09-13 PROCEDURE — 84443 ASSAY THYROID STIM HORMONE: CPT

## 2019-09-13 PROCEDURE — 6370000000 HC RX 637 (ALT 250 FOR IP): Performed by: NURSE PRACTITIONER

## 2019-09-13 PROCEDURE — 94761 N-INVAS EAR/PLS OXIMETRY MLT: CPT

## 2019-09-13 PROCEDURE — 36415 COLL VENOUS BLD VENIPUNCTURE: CPT

## 2019-09-13 PROCEDURE — 6370000000 HC RX 637 (ALT 250 FOR IP)

## 2019-09-13 PROCEDURE — 99222 1ST HOSP IP/OBS MODERATE 55: CPT | Performed by: PHYSICIAN ASSISTANT

## 2019-09-13 RX ORDER — TRAZODONE HYDROCHLORIDE 50 MG/1
25 TABLET ORAL NIGHTLY PRN
Status: DISCONTINUED | OUTPATIENT
Start: 2019-09-13 | End: 2019-09-17 | Stop reason: HOSPADM

## 2019-09-13 RX ORDER — RISPERIDONE 1 MG/1
1 TABLET, ORALLY DISINTEGRATING ORAL 2 TIMES DAILY
Status: DISCONTINUED | OUTPATIENT
Start: 2019-09-13 | End: 2019-09-17 | Stop reason: HOSPADM

## 2019-09-13 RX ORDER — HYDROCHLOROTHIAZIDE 25 MG/1
25 TABLET ORAL DAILY
Status: DISCONTINUED | OUTPATIENT
Start: 2019-09-13 | End: 2019-09-17 | Stop reason: HOSPADM

## 2019-09-13 RX ORDER — RISPERIDONE 1 MG/1
TABLET, ORALLY DISINTEGRATING ORAL
Status: DISPENSED
Start: 2019-09-13 | End: 2019-09-14

## 2019-09-13 RX ORDER — LOSARTAN POTASSIUM 25 MG/1
50 TABLET ORAL DAILY
Status: DISCONTINUED | OUTPATIENT
Start: 2019-09-13 | End: 2019-09-17 | Stop reason: HOSPADM

## 2019-09-13 RX ORDER — ATORVASTATIN CALCIUM 10 MG/1
20 TABLET, FILM COATED ORAL NIGHTLY
Status: DISCONTINUED | OUTPATIENT
Start: 2019-09-13 | End: 2019-09-17 | Stop reason: HOSPADM

## 2019-09-13 RX ORDER — LORAZEPAM 0.5 MG/1
0.5 TABLET ORAL EVERY 6 HOURS PRN
Status: DISCONTINUED | OUTPATIENT
Start: 2019-09-13 | End: 2019-09-17 | Stop reason: HOSPADM

## 2019-09-13 RX ORDER — ALBUTEROL SULFATE 90 UG/1
2 AEROSOL, METERED RESPIRATORY (INHALATION) EVERY 6 HOURS PRN
Status: DISCONTINUED | OUTPATIENT
Start: 2019-09-13 | End: 2019-09-17 | Stop reason: HOSPADM

## 2019-09-13 RX ORDER — LORAZEPAM 2 MG/ML
1 INJECTION INTRAMUSCULAR EVERY 6 HOURS PRN
Status: DISCONTINUED | OUTPATIENT
Start: 2019-09-13 | End: 2019-09-17 | Stop reason: HOSPADM

## 2019-09-13 RX ORDER — HALOPERIDOL 1 MG/1
2 TABLET ORAL EVERY 6 HOURS PRN
Status: DISCONTINUED | OUTPATIENT
Start: 2019-09-13 | End: 2019-09-17 | Stop reason: HOSPADM

## 2019-09-13 RX ORDER — BENZTROPINE MESYLATE 1 MG/ML
1 INJECTION INTRAMUSCULAR; INTRAVENOUS 2 TIMES DAILY PRN
Status: DISCONTINUED | OUTPATIENT
Start: 2019-09-13 | End: 2019-09-17 | Stop reason: HOSPADM

## 2019-09-13 RX ORDER — ATENOLOL 50 MG/1
50 TABLET ORAL DAILY
Status: DISCONTINUED | OUTPATIENT
Start: 2019-09-13 | End: 2019-09-17 | Stop reason: HOSPADM

## 2019-09-13 RX ORDER — FLUTICASONE PROPIONATE 50 MCG
2 SPRAY, SUSPENSION (ML) NASAL DAILY
Status: DISCONTINUED | OUTPATIENT
Start: 2019-09-13 | End: 2019-09-17 | Stop reason: HOSPADM

## 2019-09-13 RX ORDER — HALOPERIDOL 5 MG/ML
2 INJECTION INTRAMUSCULAR EVERY 6 HOURS PRN
Status: DISCONTINUED | OUTPATIENT
Start: 2019-09-13 | End: 2019-09-17 | Stop reason: HOSPADM

## 2019-09-13 RX ORDER — ACETAMINOPHEN 325 MG/1
650 TABLET ORAL EVERY 4 HOURS PRN
Status: DISCONTINUED | OUTPATIENT
Start: 2019-09-13 | End: 2019-09-17 | Stop reason: HOSPADM

## 2019-09-13 RX ORDER — MAGNESIUM HYDROXIDE/ALUMINUM HYDROXICE/SIMETHICONE 120; 1200; 1200 MG/30ML; MG/30ML; MG/30ML
30 SUSPENSION ORAL EVERY 6 HOURS PRN
Status: DISCONTINUED | OUTPATIENT
Start: 2019-09-13 | End: 2019-09-17 | Stop reason: HOSPADM

## 2019-09-13 RX ADMIN — ATORVASTATIN CALCIUM 20 MG: 10 TABLET, FILM COATED ORAL at 20:52

## 2019-09-13 RX ADMIN — HYDROCHLOROTHIAZIDE 25 MG: 25 TABLET ORAL at 09:27

## 2019-09-13 RX ADMIN — Medication 2 PUFF: at 20:15

## 2019-09-13 RX ADMIN — RISPERIDONE 1 MG: 1 TABLET, ORALLY DISINTEGRATING ORAL at 20:52

## 2019-09-13 RX ADMIN — FLUTICASONE PROPIONATE 2 SPRAY: 50 SPRAY, METERED NASAL at 09:27

## 2019-09-13 RX ADMIN — LOSARTAN POTASSIUM 50 MG: 25 TABLET, FILM COATED ORAL at 09:27

## 2019-09-13 RX ADMIN — Medication 2 PUFF: at 20:16

## 2019-09-13 RX ADMIN — ATENOLOL 50 MG: 50 TABLET ORAL at 09:27

## 2019-09-13 ASSESSMENT — SLEEP AND FATIGUE QUESTIONNAIRES
RESTFUL SLEEP: NO
SLEEP PATTERN: DIFFICULTY FALLING ASLEEP;NIGHTMARES/TERRORS;DISTURBED/INTERRUPTED SLEEP
DIFFICULTY ARISING: YES
DIFFICULTY STAYING ASLEEP: YES
DO YOU USE A SLEEP AID: NO
DIFFICULTY FALLING ASLEEP: YES
AVERAGE NUMBER OF SLEEP HOURS: 4
DO YOU HAVE DIFFICULTY SLEEPING: YES

## 2019-09-13 ASSESSMENT — LIFESTYLE VARIABLES: HISTORY_ALCOHOL_USE: NO

## 2019-09-13 NOTE — ED NOTES
Chart copied. Copy provided for receiving facility. Transfer forms completed.   Pt to be transferred to [address] [place] [address] Ronald, ILYA  09/13/19 3474

## 2019-09-13 NOTE — BH NOTE
JABARI met with pt to complete AT/OT Leisure Assessment. Pt was calm and cooperative throughout majority of assessment. After RN offered medication, pt stated, \"I am not ill. I don't see things or hear things. I am not here for that. I'm here for a whole separate thing. \" Pt perception of stressful event prior to hospitalization is \"It was my brother. He used some filthy talk. And that's the truth of what brought me here. It hurt me real bad. It was him. I lost control over that. I got mad and when I get mad, my son puts me in a mental institution I guess. \" Pt will continue to be invited and encouraged to attend all groups to improve mood management, develop coping skills and leisure activities, and improve overall quality of life.     JABARI Marcano

## 2019-09-13 NOTE — ED PROVIDER NOTES
[facility]        Pt Name: Rich Altman  MRN: [id number]  [place] 1940  Date of evaluation: 9/12/2019  Provider: DAVID Lanier   PCP: Josh Norrsi MD    This patient was seen and evaluated by the attending physician [facility] ([facility]       Chief Complaint   Patient presents with    Panic Attack     Pt states extra stress at home, denies suicidal, Pt states not confused but fidgity and teary. States, Joy Calixto had a nervous breakdown. \"       HISTORY OF PRESENT ILLNESS   (Location/Symptom, Timing/Onset, Context/Setting, Quality, Duration, Modifying Factors, Severity)  Note limiting factors. Rich Altman is a 67 y.o. female with past medical history of asthma, COPD, chronic right foot drop, hypertension and thyroid disease who presents to the ED with complaint of a panic attack. Patient states she \"had a nervous breakdown\". Patient states there is been a lot going on for the past couple years. Patient appears very withdrawn and unable to elaborate. Patient states \"you cannot help me\". Son is the primary historian at this time. Son states patient was diagnosed with dementia and schizophrenia after a stay at [facility]. Apparently was supposed to be on schizophrenic medication but only took it for a day and then stopped taking it because she did not like the way it made her feel. Has not been able to follow-up with a neurologist or mental health specialist since then because patient constantly makes excuses and appointments to avoid being seen. Son states that patient has had increasing symptoms over the past couple of years. States she is been having visual and auditory hallucinations. Son states she has been talking to dead relatives and claiming that her house is wired. Apparently has called the police multiple times over the past year for the symptoms.   Son states he tried to get Adult Protective
This patient was a little over the neighbor Dr. Tristan Dean and I was a follow-up on her placement. Dr. Shari Gonzalez called from [facility] and we discussed the case in full and he agreed to accept the patient.   We will arrange transport         Driscilla Carrel, MD  09/12/19 5274
intact     all diagnostic, treatment, and disposition decisions were made by myself in conjunction with the advanced practice provider. Patient was seen and evaluated medically cleared she is otherwise stable feel to benefit from further evaluation by psychiatrist as she is having acute schizophrenia with paranoid delusions  For all further details of the patient's emergency department visit, please see the advanced practice provider's documentation. RADIOLOGY  Ct Head Wo Contrast    Result Date: 9/12/2019  EXAMINATION: CT OF THE HEAD WITHOUT CONTRAST  9/12/2019 5:58 pm TECHNIQUE: CT of the head was performed without the administration of intravenous contrast. Dose modulation, iterative reconstruction, and/or weight based adjustment of the mA/kV was utilized to reduce the radiation dose to as low as reasonably achievable. COMPARISON: None. HISTORY: ORDERING SYSTEM PROVIDED HISTORY: ams TECHNOLOGIST PROVIDED HISTORY: Has a \"code stroke\" or \"stroke alert\" been called? ->No Reason for Exam: ams Acuity: Acute Type of Exam: Initial Relevant Medical/Surgical History: Panic Attack (Pt states extra stress at home, denies suicidal, Pt states not confused but fidgity and teary. States, Giorgi Sierra had a nervous breakdown. \") FINDINGS: BRAIN/VENTRICLES: There is no acute intracranial hemorrhage, mass effect or midline shift. No abnormal extra-axial fluid collection. The gray-white differentiation is maintained without evidence of an acute infarct. There is no evidence of hydrocephalus. ORBITS: The visualized portion of the orbits demonstrate no acute abnormality. SINUSES: The visualized paranasal sinuses and mastoid air cells demonstrate no acute abnormality. SOFT TISSUES/SKULL:  No acute abnormality of the visualized skull or soft tissues. No acute intracranial abnormality. Xr Chest Portable    Result Date: 9/12/2019  EXAMINATION: ONE XRAY VIEW OF THE CHEST 9/12/2019 6:03 pm COMPARISON: 09/16/2018.  HISTORY: ORDERING

## 2019-09-13 NOTE — ED NOTES
Pt remains cooperative with staff, ambulated to BR again with steady gait. No new needs at this time.  remains at bedside will monitor.       Severiano Mortensen RN  09/12/19 4069

## 2019-09-13 NOTE — H&P
didn't need that. I was supposed to go to a place to lose weight. Someone told my doctor about this. I think my doctor may be in on it now\". Spoke to patient's son, David Robles ([phone]), who reports that she has been hearing voices, talking about how her ears are \"meitered\", and talking about her house being wired. He reports that she has verbalized that people are coming through cyberspace and shocking her. He also reports increased paranoia and that she feels as though people are watching her. He notes that she has been experiencing increased anger, was found banging on the velasquez of his car at 0530, and that her symptoms have been worsening in the past few months. He reports that [address] are her ex-brother and sister in law and that [address] passed away a few months ago. Sanket Patten and John Lopez were also real individuals, he notes Sanket Patten passed away 2 months ago. He reports that she was recently admitted to [facility] and was given a diagnosis of schizophrenia and dementia. He reports that this admission was approximately one year ago, however, chart review indicates March 2019. He reports that she discontinued her psychiatric medications shortly after discharge because she did not like the way they made her feel. She was also supposed to follow up with neurology post-discharge but refused to go. He reports he tried to get guardianship and POA while she was admitted, that he got APS involved but was unable to do so because patient is able to care for herself in the community.     Duration: Reports hearing voices for the past 3 years, worsening in the past 1-2 weeks  Severity: Severe  Context: Stress, progression of illness, off medications   Associated Symptoms: + auditory and visual hallucinations, delusional thinking, paranoia, increased anger, anxiety, sleep disturbance    Past Psychiatric History:    Previous Diagnoses: Patient denies, \"I'm not schizophrenic\"; per chart review, anxiety, psychosis NOS, Interval 09/12/2019 420  ms Preliminary    QTc Calculation (Bazett) 09/12/2019 426  ms Preliminary    P Axis 09/12/2019 31  degrees Preliminary    R Axis 09/12/2019 10  degrees Preliminary    T Axis 09/12/2019 17  degrees Preliminary    Diagnosis 09/12/2019 Normal sinus rhythm with sinus arrhythmiaNormal ECG   Preliminary    Troponin 09/12/2019 <0.01  <0.01 ng/mL Final    Methodology by Troponin T    Pro-BNP 09/12/2019 553* 0 - 449 pg/mL Final    Comment: Methodology by NT-proBNP    An age-independent cutoff point of 300 pg/ml has a 98%  negative predictive value excluding acute heart failure. Values exceeding the age-related cutoff values (450 pg/mL if  age<50, 900 if 50-75 and 1800 if >75) has 90% sensitivity and  84% specificity for diagnosing acute HF. In patients with  renal compromise (eGFR<60) values greater than 1200pg/ml have  a diagnostic sensitivity and specificity of 89% and 72% for  acute HF.       WBC 09/12/2019 4.5  4.0 - 11.0 K/uL Final    RBC 09/12/2019 4.38  4.00 - 5.20 M/uL Final    Hemoglobin 09/12/2019 13.0  12.0 - 16.0 g/dL Final    Hematocrit 09/12/2019 39.5  36.0 - 48.0 % Final    MCV 09/12/2019 90.2  80.0 - 100.0 fL Final    MCH 09/12/2019 29.6  26.0 - 34.0 pg Final    MCHC 09/12/2019 32.8  31.0 - 36.0 g/dL Final    RDW 09/12/2019 13.5  12.4 - 15.4 % Final    Platelets 08/23/1238 189  135 - 450 K/uL Final    MPV 09/12/2019 8.8  5.0 - 10.5 fL Final    Neutrophils % 09/12/2019 50.6  % Final    Lymphocytes % 09/12/2019 33.3  % Final    Monocytes % 09/12/2019 12.8  % Final    Eosinophils % 09/12/2019 2.5  % Final    Basophils % 09/12/2019 0.8  % Final    Neutrophils Absolute 09/12/2019 2.3  1.7 - 7.7 K/uL Final    Lymphocytes Absolute 09/12/2019 1.5  1.0 - 5.1 K/uL Final    Monocytes Absolute 09/12/2019 0.6  0.0 - 1.3 K/uL Final    Eosinophils Absolute 09/12/2019 0.1  0.0 - 0.6 K/uL Final    Basophils Absolute 09/12/2019 0.0  0.0 - 0.2 K/uL Final    Sodium 09/12/2019

## 2019-09-14 LAB
CHOLESTEROL, TOTAL: 159 MG/DL (ref 0–199)
HDLC SERPL-MCNC: 58 MG/DL (ref 40–60)
LDL CHOLESTEROL CALCULATED: 88 MG/DL
TRIGL SERPL-MCNC: 66 MG/DL (ref 0–150)
VLDLC SERPL CALC-MCNC: 13 MG/DL

## 2019-09-14 PROCEDURE — 94640 AIRWAY INHALATION TREATMENT: CPT

## 2019-09-14 PROCEDURE — 6370000000 HC RX 637 (ALT 250 FOR IP): Performed by: NURSE PRACTITIONER

## 2019-09-14 PROCEDURE — 36415 COLL VENOUS BLD VENIPUNCTURE: CPT

## 2019-09-14 PROCEDURE — 83036 HEMOGLOBIN GLYCOSYLATED A1C: CPT

## 2019-09-14 PROCEDURE — 94761 N-INVAS EAR/PLS OXIMETRY MLT: CPT

## 2019-09-14 PROCEDURE — 94150 VITAL CAPACITY TEST: CPT

## 2019-09-14 PROCEDURE — 1240000000 HC EMOTIONAL WELLNESS R&B

## 2019-09-14 PROCEDURE — 6370000000 HC RX 637 (ALT 250 FOR IP): Performed by: PSYCHIATRY & NEUROLOGY

## 2019-09-14 PROCEDURE — 80061 LIPID PANEL: CPT

## 2019-09-14 RX ADMIN — Medication 2 PUFF: at 19:23

## 2019-09-14 RX ADMIN — Medication 2 PUFF: at 07:47

## 2019-09-14 RX ADMIN — RISPERIDONE 1 MG: 1 TABLET, ORALLY DISINTEGRATING ORAL at 21:20

## 2019-09-14 RX ADMIN — FLUTICASONE PROPIONATE 2 SPRAY: 50 SPRAY, METERED NASAL at 09:22

## 2019-09-14 RX ADMIN — RISPERIDONE 1 MG: 1 TABLET, ORALLY DISINTEGRATING ORAL at 09:22

## 2019-09-14 RX ADMIN — ATORVASTATIN CALCIUM 20 MG: 10 TABLET, FILM COATED ORAL at 21:20

## 2019-09-14 ASSESSMENT — SLEEP AND FATIGUE QUESTIONNAIRES
SLEEP PATTERN: DIFFICULTY FALLING ASLEEP;INSOMNIA
DIFFICULTY STAYING ASLEEP: YES
RESTFUL SLEEP: NO
DO YOU HAVE DIFFICULTY SLEEPING: YES
DIFFICULTY FALLING ASLEEP: YES
DIFFICULTY ARISING: YES
AVERAGE NUMBER OF SLEEP HOURS: 3
DO YOU USE A SLEEP AID: NO

## 2019-09-14 ASSESSMENT — LIFESTYLE VARIABLES: HISTORY_ALCOHOL_USE: NO

## 2019-09-14 ASSESSMENT — PATIENT HEALTH QUESTIONNAIRE - PHQ9: SUM OF ALL RESPONSES TO PHQ QUESTIONS 1-9: 12

## 2019-09-14 NOTE — PROGRESS NOTES
RESPIRATORY THERAPY ASSESSMENT    Name:  [address] Record Number:  [id number]  Age: 67 y.o. Gender: female  : 1940  Today's Date:  2019  Room:  [address] [place] [address]    Assessment     Is the patient being admitted for a COPD or Asthma exacerbation? No   (If yes the patient will be seen every 4 hours for the first 24 hours and then reassessed)    Patient Admission Diagnosis      Allergies  Allergies   Allergen Reactions    Prozac [Fluoxetine Hcl] Swelling       Minimum Predicted Vital Capacity:     850          Actual Vital Capacity:      1860              Pulmonary History:COPD and Asthma  Home Oxygen Therapy:  room air  Home Respiratory Therapy:  Albuterol 2PUFFS Q6PRN,  Advair BID. Current Respiratory Therapy:  Dulera 200-5 2puffs BID,  Albuterol 2puffs Q6PRN. Treatment Type: MDI  Medications: Mometasone/Formoterol    Respiratory Severity Index(RSI)   Patients with orders for inhalation medications, oxygen, or any therapeutic treatment modality will be placed on Respiratory Protocol. They will be assessed with the first treatment and at least every 72 hours thereafter. The following severity scale will be used to determine frequency of treatment intervention.     Smoking History: Pulmonary Disease or Smoking History, Greater than 15 pack year = 2    Social History  Social History     Tobacco Use    Smoking status: Never Smoker    Smokeless tobacco: Never Used   Substance Use Topics    Alcohol use: No     Alcohol/week: 0.0 standard drinks    Drug use: No       Recent Surgical History: None = 0  Past Surgical History  Past Surgical History:   Procedure Laterality Date    ANKLE SURGERY      right fx    BACK SURGERY      L4-5       Level of Consciousness: Alert, Oriented, and Cooperative = 0    Level of Activity: Walking unassisted = 0    Respiratory Pattern: Regular Pattern; RR 8-20 = 0    Breath Sounds: Diminshed bilaterally and/or crackles = 2    Sputum   ,  , Sputum How Obtained:

## 2019-09-14 NOTE — PROGRESS NOTES
Patient isolative to room this shift. Denies SI/HI/AVH but does appear to be distracted and RTIS when staff not in the room. Compliant with medications. When asked about history of hearing voices, patient stated, \"where did you get that. I have never heard voices\" but then a few minutes later she said, \"sometimes I hear voices but that has been a long time ago. \" When asked why she was admitted to the unit, patient stated, \"I had an argument with my brother-in-law Yemi\" (per chart review, Rashid Huynh is her  brother-in-law). Patient has right foot drop but gait is slow and steady without assistive devices. Fall precautions in place, bed alarm on, nonskid foot wear applied, bed in lowest position, and call light within reach. Will continue to monitor.

## 2019-09-15 LAB
ESTIMATED AVERAGE GLUCOSE: 102.5 MG/DL
HBA1C MFR BLD: 5.2 %

## 2019-09-15 PROCEDURE — 94761 N-INVAS EAR/PLS OXIMETRY MLT: CPT

## 2019-09-15 PROCEDURE — 94640 AIRWAY INHALATION TREATMENT: CPT

## 2019-09-15 PROCEDURE — 99233 SBSQ HOSP IP/OBS HIGH 50: CPT | Performed by: PSYCHIATRY & NEUROLOGY

## 2019-09-15 PROCEDURE — 1240000000 HC EMOTIONAL WELLNESS R&B

## 2019-09-15 PROCEDURE — 6370000000 HC RX 637 (ALT 250 FOR IP): Performed by: PSYCHIATRY & NEUROLOGY

## 2019-09-15 PROCEDURE — 6370000000 HC RX 637 (ALT 250 FOR IP): Performed by: NURSE PRACTITIONER

## 2019-09-15 RX ADMIN — LOSARTAN POTASSIUM 50 MG: 25 TABLET, FILM COATED ORAL at 09:26

## 2019-09-15 RX ADMIN — ATORVASTATIN CALCIUM 20 MG: 10 TABLET, FILM COATED ORAL at 20:20

## 2019-09-15 RX ADMIN — TRAZODONE HYDROCHLORIDE 25 MG: 50 TABLET ORAL at 20:20

## 2019-09-15 RX ADMIN — RISPERIDONE 1 MG: 1 TABLET, ORALLY DISINTEGRATING ORAL at 09:26

## 2019-09-15 RX ADMIN — Medication 2 PUFF: at 09:35

## 2019-09-15 RX ADMIN — ATENOLOL 50 MG: 50 TABLET ORAL at 09:25

## 2019-09-15 RX ADMIN — FLUTICASONE PROPIONATE 2 SPRAY: 50 SPRAY, METERED NASAL at 09:26

## 2019-09-15 RX ADMIN — Medication 2 PUFF: at 18:10

## 2019-09-15 RX ADMIN — TRAZODONE HYDROCHLORIDE 25 MG: 50 TABLET ORAL at 01:03

## 2019-09-15 RX ADMIN — HYDROCHLOROTHIAZIDE 25 MG: 25 TABLET ORAL at 09:26

## 2019-09-15 RX ADMIN — RISPERIDONE 1 MG: 1 TABLET, ORALLY DISINTEGRATING ORAL at 20:20

## 2019-09-15 RX ADMIN — Medication 2 PUFF: at 14:10

## 2019-09-15 NOTE — GROUP NOTE
Group Therapy Note    Date: September 15    Group Start Time: 1100  Group End Time: 1140  Group Topic: Cognitive Skills    [address]             Patient's Goal:  Pt engaged in group activity. Engaged in meditation activity. Talked after the activity about relaxation and what helps calm the pt down    Notes: Pt was able to complete group and participate. Pt was able to answer questions and offer insight to others. Status After Intervention:  Improved    Participation Level:  Active Listener    Participation Quality: Appropriate and Attentive      Speech:  normal      Thought Process/Content: Logical  Linear      Affective Functioning: Congruent      Mood: anxious      Level of consciousness:  Alert and Oriented x4      Response to Learning: Able to verbalize current knowledge/experience and Able to retain information      Endings: None Reported    Modes of Intervention: Education and Support      Discipline Responsible: /Counselor      Signature:  Grzegorz Love

## 2019-09-15 NOTE — PROGRESS NOTES
Department of Psychiatry  Attending Progress Note  Chief Complaint: psychosis  Erin Rodriguez stated that she had a \"nervous breakdown. \" She is dealing with \"too much stress. \" Feels safe here. Denied voices. Talked about Yemi's death which she doesn't belive  Patient's chart was reviewed and collaborated with  about the treatment plan. SUBJECTIVE:    Patient is feeling unchanged. Suicidal ideation:  denies suicidal ideation. Patient does not have medication side effects. ROS: Patient has new complaints: no  Sleeping adequately:  Yes   Appetite adequate: Yes  Attending groups: Yes  Visitors:No    OBJECTIVE    Physical  VITALS:  /74   Pulse 94   Temp 97.1 °F (36.2 °C) (Oral)   Resp 16   Ht 5' 5\" (1.651 m)   Wt 159 lb (72.1 kg)   SpO2 97%   BMI 26.46 kg/m²     Mental Status Examination:  Patients appearance was ill-appearing. Thoughts are Illogical. Homicidal ideations none. No abnormal movements, tics or mannerisms. Memory impaired Aims 0. Concentration Fair. Alert and oriented X 4. Insight and Judgement impaired insight. Patient was cooperative. Patient gait in bed.  Mood constricted, affect flat affect Hallucinations Absent, suicidal ideations no specific plan to harm self Speech soft  Data  Labs:   Admission on 09/13/2019   Component Date Value Ref Range Status    Ventricular Rate 09/13/2019 61  BPM Final    Atrial Rate 09/13/2019 61  BPM Final    P-R Interval 09/13/2019 204  ms Final    QRS Duration 09/13/2019 84  ms Final    Q-T Interval 09/13/2019 410  ms Final    QTc Calculation (Bazett) 09/13/2019 412  ms Final    P Axis 09/13/2019 68  degrees Final    R Axis 09/13/2019 52  degrees Final    T Axis 09/13/2019 59  degrees Final    Diagnosis 09/13/2019 Normal sinus rhythm with sinus arrhythmiaNormal ECGWhen compared with ECG of 12-SEP-2019 21:27,T wave inversion no longer evident in Inferior leadsConfirmed by Melo Hines MD, [address] (1986) on 9/13/2019 9:31:28 PM   Final    TSH Problem:    Acute psychosis ([facility]  Active Problems:    Hypertension    Hypercholesterolemia    Hypothyroid    COPD (chronic obstructive pulmonary disease) ([facility]    Uncomplicated asthma  Resolved Problems:    * No resolved hospital problems. *       1. Patient s symptoms   show no change  2. Probable discharge is next week  3. Discharge planning is incomplete  4. Suicidal ideation is better  5. Total time with patient was 40 minutes and more than 50 % of that time was spent counseling the patient on their symptoms, treatment and expected goals.

## 2019-09-15 NOTE — PROGRESS NOTES
[facility]  Initial Interdisciplinary Treatment Plan NOTE    Review Date & Time: 9/15/19 1325    Patient was not in treatment team    Admission Type:   Admission Type:  Involuntary    Reason for admission:  Reason for Admission: Acute Psychosis      Estimated Length of Stay Update:  5-7 days  Estimated Discharge Date Update: 9/2-9/22    PATIENT STRENGTHS:  Patient Strengths Strengths: Positive Support, Social Skills  Patient Strengths and Limitations:Limitations: Tendency to isolate self  Addictive Behavior:Addictive Behavior  In the past 3 months, have you felt or has someone told you that you have a problem with:  : Eating (too much/too little)  Do you have a history of Chemical Use?: No  Do you have a history of Alcohol Use?: No  Do you have a history of Street Drug Abuse?: No  Histroy of Prescripton Drug Abuse?: No  Medical Problems:  Past Medical History:   Diagnosis Date    Asthma     COPD (chronic obstructive pulmonary disease) ([facility]     Foot drop, right foot     Hypertension     Thyroid disease        EDUCATION:   Learner Progress Toward Treatment Goals: Reviewed goals and plan of care    Method: Individual    Outcome: No evidence of Learning    PATIENT GOALS: n/a    PLAN/TREATMENT RECOMMENDATIONS UPDATE:Evaluate and treat    GOALS UPDATE:   Time frame for Short-Term Goals: n/a    Gera Jon, RN

## 2019-09-16 PROCEDURE — 6370000000 HC RX 637 (ALT 250 FOR IP): Performed by: PSYCHIATRY & NEUROLOGY

## 2019-09-16 PROCEDURE — 99233 SBSQ HOSP IP/OBS HIGH 50: CPT | Performed by: NURSE PRACTITIONER

## 2019-09-16 PROCEDURE — 1240000000 HC EMOTIONAL WELLNESS R&B

## 2019-09-16 PROCEDURE — 6370000000 HC RX 637 (ALT 250 FOR IP): Performed by: NURSE PRACTITIONER

## 2019-09-16 PROCEDURE — 94640 AIRWAY INHALATION TREATMENT: CPT

## 2019-09-16 RX ADMIN — ATENOLOL 50 MG: 50 TABLET ORAL at 09:07

## 2019-09-16 RX ADMIN — HYDROCHLOROTHIAZIDE 25 MG: 25 TABLET ORAL at 09:07

## 2019-09-16 RX ADMIN — ATORVASTATIN CALCIUM 20 MG: 10 TABLET, FILM COATED ORAL at 21:56

## 2019-09-16 RX ADMIN — RISPERIDONE 1 MG: 1 TABLET, ORALLY DISINTEGRATING ORAL at 09:07

## 2019-09-16 RX ADMIN — RISPERIDONE 1 MG: 1 TABLET, ORALLY DISINTEGRATING ORAL at 21:56

## 2019-09-16 RX ADMIN — LOSARTAN POTASSIUM 50 MG: 25 TABLET, FILM COATED ORAL at 09:06

## 2019-09-16 RX ADMIN — FLUTICASONE PROPIONATE 2 SPRAY: 50 SPRAY, METERED NASAL at 09:06

## 2019-09-16 RX ADMIN — Medication 2 PUFF: at 07:47

## 2019-09-16 NOTE — GROUP NOTE
Group Therapy Note    Date: September 16    Group Start Time: 1020  Group End Time: 1210  Group Topic: Psychoeducation    [facility]        Group Therapy Note    Attendees: 3    Patient's Goal: to engage in watching a movie to promote relaxation, laughter, and socialization and to discuss the positive benefits of doing acts of kindness. Notes: Edvin Pacheco actively engaged in watching a movie. Edvin Pacheco appeared to achieve a relaxed state. Edvin Pacheco was excused from group approximately an hour early due to meeting with NP. Edvin Pacheco did not return to group. Status After Intervention:  Improved    Participation Level:  Active Listener and Interactive    Participation Quality: Appropriate and Attentive      Speech:  normal      Thought Process/Content: Linear      Affective Functioning: Congruent      Mood: euthymic      Level of consciousness:  Alert and Attentive      Response to Learning: Capable of insight, Able to change behavior and Progressing to goal      Endings: None Reported    Modes of Intervention: Education, Support, Socialization, Clarifying, Problem-solving and Activity      Discipline Responsible: Psychoeducational Specialist      Signature:  MADELINE Wren MT-BC

## 2019-09-17 VITALS
BODY MASS INDEX: 26.49 KG/M2 | TEMPERATURE: 97.3 F | SYSTOLIC BLOOD PRESSURE: 105 MMHG | RESPIRATION RATE: 16 BRPM | HEART RATE: 83 BPM | WEIGHT: 159 LBS | DIASTOLIC BLOOD PRESSURE: 73 MMHG | HEIGHT: 65 IN | OXYGEN SATURATION: 97 %

## 2019-09-17 PROCEDURE — 6370000000 HC RX 637 (ALT 250 FOR IP): Performed by: PSYCHIATRY & NEUROLOGY

## 2019-09-17 PROCEDURE — 5130000000 HC BRIDGE APPOINTMENT

## 2019-09-17 PROCEDURE — 99239 HOSP IP/OBS DSCHRG MGMT >30: CPT | Performed by: NURSE PRACTITIONER

## 2019-09-17 PROCEDURE — 6370000000 HC RX 637 (ALT 250 FOR IP): Performed by: NURSE PRACTITIONER

## 2019-09-17 RX ORDER — RISPERIDONE 1 MG/1
1 TABLET, ORALLY DISINTEGRATING ORAL 2 TIMES DAILY
Qty: 60 TABLET | Refills: 0 | Status: SHIPPED | OUTPATIENT
Start: 2019-09-17 | End: 2019-09-19 | Stop reason: CLARIF

## 2019-09-17 RX ADMIN — LOSARTAN POTASSIUM 50 MG: 25 TABLET, FILM COATED ORAL at 09:10

## 2019-09-17 RX ADMIN — FLUTICASONE PROPIONATE 2 SPRAY: 50 SPRAY, METERED NASAL at 09:11

## 2019-09-17 RX ADMIN — RISPERIDONE 1 MG: 1 TABLET, ORALLY DISINTEGRATING ORAL at 09:10

## 2019-09-17 RX ADMIN — HYDROCHLOROTHIAZIDE 25 MG: 25 TABLET ORAL at 09:11

## 2019-09-17 RX ADMIN — ATENOLOL 50 MG: 50 TABLET ORAL at 09:10
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
## 2019-09-19 ENCOUNTER — OFFICE VISIT (OUTPATIENT)
Dept: FAMILY MEDICINE CLINIC | Age: 79
End: 2019-09-19
Payer: MEDICARE

## 2019-09-19 VITALS
SYSTOLIC BLOOD PRESSURE: 142 MMHG | BODY MASS INDEX: 26.63 KG/M2 | DIASTOLIC BLOOD PRESSURE: 80 MMHG | HEART RATE: 77 BPM | OXYGEN SATURATION: 96 % | WEIGHT: 160 LBS

## 2019-09-19 DIAGNOSIS — E78.00 HYPERCHOLESTEROLEMIA: ICD-10-CM

## 2019-09-19 DIAGNOSIS — F20.81 SCHIZOPHRENIFORM DISORDER (HCC): ICD-10-CM

## 2019-09-19 DIAGNOSIS — J44.9 CHRONIC OBSTRUCTIVE PULMONARY DISEASE, UNSPECIFIED COPD TYPE (HCC): Primary | ICD-10-CM

## 2019-09-19 DIAGNOSIS — I10 ESSENTIAL HYPERTENSION: ICD-10-CM

## 2019-09-19 DIAGNOSIS — Z23 NEED FOR VACCINATION: ICD-10-CM

## 2019-09-19 DIAGNOSIS — E03.9 ACQUIRED HYPOTHYROIDISM: ICD-10-CM

## 2019-09-19 PROCEDURE — 1111F DSCHRG MED/CURRENT MED MERGE: CPT | Performed by: FAMILY MEDICINE

## 2019-09-19 PROCEDURE — 99214 OFFICE O/P EST MOD 30 MIN: CPT | Performed by: FAMILY MEDICINE

## 2019-09-19 PROCEDURE — G8417 CALC BMI ABV UP PARAM F/U: HCPCS | Performed by: FAMILY MEDICINE

## 2019-09-19 PROCEDURE — 1123F ACP DISCUSS/DSCN MKR DOCD: CPT | Performed by: FAMILY MEDICINE

## 2019-09-19 PROCEDURE — 3023F SPIROM DOC REV: CPT | Performed by: FAMILY MEDICINE

## 2019-09-19 PROCEDURE — G8427 DOCREV CUR MEDS BY ELIG CLIN: HCPCS | Performed by: FAMILY MEDICINE

## 2019-09-19 PROCEDURE — G0008 ADMIN INFLUENZA VIRUS VAC: HCPCS | Performed by: FAMILY MEDICINE

## 2019-09-19 PROCEDURE — 4040F PNEUMOC VAC/ADMIN/RCVD: CPT | Performed by: FAMILY MEDICINE

## 2019-09-19 PROCEDURE — 1090F PRES/ABSN URINE INCON ASSESS: CPT | Performed by: FAMILY MEDICINE

## 2019-09-19 PROCEDURE — 90653 IIV ADJUVANT VACCINE IM: CPT | Performed by: FAMILY MEDICINE

## 2019-09-19 PROCEDURE — G8399 PT W/DXA RESULTS DOCUMENT: HCPCS | Performed by: FAMILY MEDICINE

## 2019-09-19 PROCEDURE — 1036F TOBACCO NON-USER: CPT | Performed by: FAMILY MEDICINE

## 2019-09-19 PROCEDURE — G8926 SPIRO NO PERF OR DOC: HCPCS | Performed by: FAMILY MEDICINE

## 2019-09-19 RX ORDER — RISPERIDONE 1 MG/1
1 TABLET, ORALLY DISINTEGRATING ORAL 2 TIMES DAILY
Qty: 60 TABLET | Refills: 5 | Status: SHIPPED | OUTPATIENT
Start: 2019-09-19 | End: 2021-03-11 | Stop reason: SDUPTHER

## 2019-09-19 RX ORDER — ATORVASTATIN CALCIUM 20 MG/1
20 TABLET, FILM COATED ORAL DAILY
Qty: 90 TABLET | Refills: 1 | Status: SHIPPED | OUTPATIENT
Start: 2019-09-19 | End: 2020-07-08

## 2019-09-19 RX ORDER — ALBUTEROL SULFATE 90 UG/1
2 AEROSOL, METERED RESPIRATORY (INHALATION) EVERY 6 HOURS PRN
Qty: 1 INHALER | Refills: 11 | Status: SHIPPED | OUTPATIENT
Start: 2019-09-19 | End: 2021-03-11 | Stop reason: SDUPTHER

## 2019-09-19 RX ORDER — ATENOLOL 50 MG/1
TABLET ORAL
Qty: 90 TABLET | Refills: 1 | Status: SHIPPED | OUTPATIENT
Start: 2019-09-19 | End: 2020-06-25

## 2019-09-19 RX ORDER — HYDROCHLOROTHIAZIDE 25 MG/1
25 TABLET ORAL DAILY
Qty: 90 TABLET | Refills: 1 | Status: SHIPPED | OUTPATIENT
Start: 2019-09-19 | End: 2020-07-08

## 2019-09-19 RX ORDER — TELMISARTAN 40 MG/1
40 TABLET ORAL DAILY
Qty: 90 TABLET | Refills: 1 | Status: SHIPPED | OUTPATIENT
Start: 2019-09-19 | End: 2020-02-27 | Stop reason: SDUPTHER

## 2019-09-19 RX ORDER — RISPERIDONE 1 MG/1
TABLET, FILM COATED ORAL 2 TIMES DAILY
COMMUNITY
Start: 2019-09-17 | End: 2019-09-19 | Stop reason: CLARIF

## 2019-09-19 ASSESSMENT — PATIENT HEALTH QUESTIONNAIRE - PHQ9
SUM OF ALL RESPONSES TO PHQ QUESTIONS 1-9: 0
2. FEELING DOWN, DEPRESSED OR HOPELESS: 0
SUM OF ALL RESPONSES TO PHQ9 QUESTIONS 1 & 2: 0
1. LITTLE INTEREST OR PLEASURE IN DOING THINGS: 0
SUM OF ALL RESPONSES TO PHQ QUESTIONS 1-9: 0

## 2019-09-24 ENCOUNTER — TELEPHONE (OUTPATIENT)
Dept: PHARMACY | Facility: CLINIC | Age: 79
End: 2019-09-24

## 2019-09-24 NOTE — TELEPHONE ENCOUNTER
CLINICAL PHARMACY: ADHERENCE REVIEW    Identified care gap per [place] insurance: TELMISARTAN TAB 40MG adherence  Per records, appears 90-day supply last filled 6/2/19 due 8/31/19 [facility] 84%    Per [employer] [phone]   TELMISARTAN TAB 40MG last filled on 9/22/19 for a 90-day supply billed thru patient's insurance. Picked up yesterday. Refillable. Attempting to reach patient to review. Refilled nearly 1 month late. Left message asking for return call to toll free [phone] option 7.

## 2019-09-27 NOTE — TELEPHONE ENCOUNTER
2nd Attempt Documentation:  2nd attempt to contact this patient regarding the previous message  CLINICAL PHARMACY: ADHERENCE REVIEW  Patient unavailable at the time of call. Left following message on home TAD: please call back at toll-free [phone] option 7 to retrieve previous message. Letter mailed to patient.

## 2020-02-27 RX ORDER — TELMISARTAN 40 MG/1
40 TABLET ORAL DAILY
Qty: 90 TABLET | Refills: 1 | Status: SHIPPED | OUTPATIENT
Start: 2020-02-27 | End: 2020-10-05

## 2020-06-25 RX ORDER — ATENOLOL 50 MG/1
TABLET ORAL
Qty: 90 TABLET | Refills: 0 | Status: SHIPPED | OUTPATIENT
Start: 2020-06-25 | End: 2020-09-28

## 2020-07-08 RX ORDER — HYDROCHLOROTHIAZIDE 25 MG/1
TABLET ORAL
Qty: 90 TABLET | Refills: 0 | Status: SHIPPED | OUTPATIENT
Start: 2020-07-08 | End: 2020-10-05

## 2020-07-08 RX ORDER — ATORVASTATIN CALCIUM 20 MG/1
TABLET, FILM COATED ORAL
Qty: 90 TABLET | Refills: 0 | Status: SHIPPED | OUTPATIENT
Start: 2020-07-08 | End: 2020-10-05

## 2020-09-28 RX ORDER — ATENOLOL 50 MG/1
TABLET ORAL
Qty: 90 TABLET | Refills: 0 | Status: SHIPPED | OUTPATIENT
Start: 2020-09-28 | End: 2021-03-11 | Stop reason: SDUPTHER

## 2020-10-05 RX ORDER — ATORVASTATIN CALCIUM 20 MG/1
TABLET, FILM COATED ORAL
Qty: 30 TABLET | Refills: 0 | Status: SHIPPED | OUTPATIENT
Start: 2020-10-05 | End: 2020-10-06

## 2020-10-05 RX ORDER — TELMISARTAN 40 MG/1
TABLET ORAL
Qty: 30 TABLET | Refills: 0 | Status: SHIPPED | OUTPATIENT
Start: 2020-10-05 | End: 2020-10-06

## 2020-10-05 RX ORDER — HYDROCHLOROTHIAZIDE 25 MG/1
TABLET ORAL
Qty: 30 TABLET | Refills: 0 | Status: SHIPPED | OUTPATIENT
Start: 2020-10-05 | End: 2020-10-06

## 2020-10-06 RX ORDER — ATORVASTATIN CALCIUM 20 MG/1
TABLET, FILM COATED ORAL
Qty: 30 TABLET | Refills: 0 | Status: SHIPPED | OUTPATIENT
Start: 2020-10-06 | End: 2020-11-11

## 2020-10-06 RX ORDER — HYDROCHLOROTHIAZIDE 25 MG/1
TABLET ORAL
Qty: 30 TABLET | Refills: 0 | Status: SHIPPED | OUTPATIENT
Start: 2020-10-06 | End: 2020-11-11

## 2020-10-06 RX ORDER — TELMISARTAN 40 MG/1
TABLET ORAL
Qty: 30 TABLET | Refills: 0 | Status: SHIPPED | OUTPATIENT
Start: 2020-10-06 | End: 2020-11-11

## 2020-11-11 RX ORDER — ATORVASTATIN CALCIUM 20 MG/1
TABLET, FILM COATED ORAL
Qty: 30 TABLET | Refills: 0 | Status: SHIPPED | OUTPATIENT
Start: 2020-11-11 | End: 2021-02-18 | Stop reason: SDUPTHER

## 2020-11-11 RX ORDER — HYDROCHLOROTHIAZIDE 25 MG/1
TABLET ORAL
Qty: 30 TABLET | Refills: 0 | Status: SHIPPED | OUTPATIENT
Start: 2020-11-11 | End: 2021-03-11 | Stop reason: SDUPTHER

## 2020-11-11 RX ORDER — TELMISARTAN 40 MG/1
TABLET ORAL
Qty: 30 TABLET | Refills: 0 | Status: SHIPPED | OUTPATIENT
Start: 2020-11-11 | End: 2021-02-16

## 2021-02-16 RX ORDER — TELMISARTAN 40 MG/1
TABLET ORAL
Qty: 30 TABLET | Refills: 0 | Status: SHIPPED | OUTPATIENT
Start: 2021-02-16 | End: 2021-03-01 | Stop reason: SDUPTHER

## 2021-02-16 NOTE — TELEPHONE ENCOUNTER
Medication:   Requested Prescriptions     Pending Prescriptions Disp Refills    telmisartan (MICARDIS) 40 MG tablet [Pharmacy Med Name: TELMISARTAN 40 MG TABLET] 30 tablet 0     Sig: TAKE ONE TABLET BY MOUTH DAILY       Patient Phone Number: [phone] (home)     Last appt: 1/3/2020   Next appt: Visit date not found    Last OARRS: No flowsheet data found.   PDMP Monitoring:    Last PDMP Arzate Lower as Reviewed [facility]):  Review User Review Instant Review Result          Preferred Pharmacy:   [address] [place] [address] [phone] [facility] [phone]  [place] [address] [address]  Phone: [phone] Fax: [phone]

## 2021-02-18 RX ORDER — ATORVASTATIN CALCIUM 20 MG/1
TABLET, FILM COATED ORAL
Qty: 90 TABLET | Refills: 0 | Status: SHIPPED | OUTPATIENT
Start: 2021-02-18 | End: 2021-03-11 | Stop reason: SDUPTHER

## 2021-02-18 NOTE — TELEPHONE ENCOUNTER
Medication:   Requested Prescriptions     Pending Prescriptions Disp Refills    atorvastatin (LIPITOR) 20 MG tablet 30 tablet 0     Sig: TAKE ONE TABLET BY MOUTH DAILY      Pharmacy requesting 90 day    Patient Phone Number: [phone] (home)     Last appt: 1/3/2020   Next appt: Visit date not found    Last OARRS: No flowsheet data found.   PDMP Monitoring:    Last PDMP Poncho Self as Reviewed [facility]):  Review User Review Instant Review Result          Preferred Pharmacy:   [place] [address] [phone] [facility] [phone]  [place] [address] [address]  Phone: [phone] Fax: [phone]

## 2021-03-01 RX ORDER — TELMISARTAN 40 MG/1
TABLET ORAL
Qty: 30 TABLET | Refills: 0 | Status: SHIPPED | OUTPATIENT
Start: 2021-03-01 | End: 2021-04-19 | Stop reason: SDUPTHER

## 2021-03-11 RX ORDER — ATENOLOL 50 MG/1
TABLET ORAL
Qty: 30 TABLET | Refills: 0 | Status: SHIPPED | OUTPATIENT
Start: 2021-03-11 | End: 2021-04-21 | Stop reason: SDUPTHER

## 2021-03-11 RX ORDER — ALBUTEROL SULFATE 90 UG/1
2 AEROSOL, METERED RESPIRATORY (INHALATION) EVERY 6 HOURS PRN
Qty: 1 INHALER | Refills: 0 | Status: SHIPPED | OUTPATIENT
Start: 2021-03-11 | End: 2021-04-21 | Stop reason: SDUPTHER

## 2021-03-11 RX ORDER — HYDROCHLOROTHIAZIDE 25 MG/1
TABLET ORAL
Qty: 30 TABLET | Refills: 0 | Status: SHIPPED | OUTPATIENT
Start: 2021-03-11 | End: 2021-04-21 | Stop reason: SDUPTHER

## 2021-03-11 RX ORDER — RISPERIDONE 1 MG/1
1 TABLET, ORALLY DISINTEGRATING ORAL 2 TIMES DAILY
Qty: 60 TABLET | Refills: 0 | Status: SHIPPED | OUTPATIENT
Start: 2021-03-11 | End: 2021-04-21

## 2021-03-11 RX ORDER — ATORVASTATIN CALCIUM 20 MG/1
TABLET, FILM COATED ORAL
Qty: 30 TABLET | Refills: 0 | Status: SHIPPED | OUTPATIENT
Start: 2021-03-11 | End: 2021-04-21 | Stop reason: SDUPTHER

## 2021-03-11 NOTE — TELEPHONE ENCOUNTER
Ok to RF meds x 1 month  Ask if he thinks she would be willing to do a visit over video/virtual. That way wouldn't have to come to office.     If she is not going to be able to come in to appt then would recommend looking into one of the visiting physician services as she really needs to see someone

## 2021-03-11 NOTE — TELEPHONE ENCOUNTER
----- Message from Juliet Teran sent at 3/11/2021  1:47 PM EST -----  Subject: Message to Provider    QUESTIONS  Information for Provider? Son of pt Elijah Marte) called stating phillip he had   to cancel pt's appt. He stated that pt was diagnosed with schizophrenia   and gets upset when it's time for her appts. He stated that he tried to   get her to go   but she she refused. Pt still needs med refill.  ---------------------------------------------------------------------------  --------------  CALL BACK INFO  What is the best way for the office to contact you? OK to leave message on   voicemail  Preferred Call Back Phone Number? [phone]  ---------------------------------------------------------------------------  --------------  SCRIPT ANSWERS  Relationship to Patient? Other  Representative Name? Mao Brewer  Is the Representative on the appropriate HIPAA document in Epic?  Yes

## 2021-03-11 NOTE — TELEPHONE ENCOUNTER
Spoke with patient's son Pao Gilman, notified of RK message. Refills sent to [address] and will call back on Monday to schedule virtual visit.

## 2021-03-29 ENCOUNTER — TELEPHONE (OUTPATIENT)
Dept: PHARMACY | Facility: CLINIC | Age: 81
End: 2021-03-29

## 2021-03-29 NOTE — LETTER
[place]  [address], [address]        [address] [address]           03/31/21     Dear Destiny Voss,    We tried to reach you recently regarding your Telmisartan and Atorvastatin, but were unable to reach you on the telephone. It appears that this medication has not been filled at proper times. We are worried you might be missing doses or not taking it as directed. It is important that you take your medications regularly and try not to miss a single dose. Some ways to help you remember to take and refill your medications are to:  · Use a pill box, set an alarm, and/or keep your medication near something that you do every day  · Fill a 3-month supply of your prescription at a time to save you time and trips to the pharmacy  if you would like assistance in switching your prescriptions to a 3-month supply, please contact us.   · Ask your pharmacy if they participate in [facility]", a program where you can  all of your medications on the same day  · Ask your pharmacy if you can be set up with automatic refill, where they will automatically refill your prescription when it is due and let you know it's ready to     Sincerely,   Praful Boggs, [facility]   Direct: [phone]  Department, toll free: [phone], option 7

## 2021-03-31 NOTE — TELEPHONE ENCOUNTER
CLINICAL PHARMACY CONSULT: MED RECONCILIATION/REVIEW ADDENDUM    For Pharmacy Admin Tracking Only    PHSO: Yes  Total # of Interventions Recommended: 2  Recommended intervention potential cost savings: 0  Total Interventions Accepted: 0  Time Spent (min): [address], [facility]

## 2021-04-19 RX ORDER — TELMISARTAN 40 MG/1
TABLET ORAL
Qty: 30 TABLET | Refills: 0 | Status: SHIPPED | OUTPATIENT
Start: 2021-04-19 | End: 2021-04-27 | Stop reason: SDUPTHER

## 2021-04-19 NOTE — TELEPHONE ENCOUNTER
Medication:   Requested Prescriptions      No prescriptions requested or ordered in this encounter      Last Filled:  3/1/21    Patient Phone Number: [phone] (home)     Last appt: 1/3/2020   Next appt: 4/21/2021    Last OARRS: No flowsheet data found.   PDMP Monitoring:    Last PDMP Thaddeus Long as Reviewed [facility]):  Review User Review Instant Review Result          Preferred Pharmacy:   [employer] [address] [place] [address] [phone] Carlotta Thomas [phone]  [place] [address] [address]  Phone: [phone] Fax: [phone]

## 2021-04-21 ENCOUNTER — VIRTUAL VISIT (OUTPATIENT)
Dept: FAMILY MEDICINE CLINIC | Age: 81
End: 2021-04-21
Payer: MEDICARE

## 2021-04-21 DIAGNOSIS — I10 ESSENTIAL HYPERTENSION: Primary | ICD-10-CM

## 2021-04-21 DIAGNOSIS — J44.9 CHRONIC OBSTRUCTIVE PULMONARY DISEASE, UNSPECIFIED COPD TYPE (HCC): ICD-10-CM

## 2021-04-21 DIAGNOSIS — E03.9 ACQUIRED HYPOTHYROIDISM: ICD-10-CM

## 2021-04-21 DIAGNOSIS — F20.81 SCHIZOPHRENIFORM DISORDER (HCC): ICD-10-CM

## 2021-04-21 DIAGNOSIS — G47.00 INSOMNIA, PERSISTENT: ICD-10-CM

## 2021-04-21 PROBLEM — F23 ACUTE PSYCHOSIS (HCC): Status: RESOLVED | Noted: 2019-09-12 | Resolved: 2021-04-21

## 2021-04-21 PROCEDURE — 1123F ACP DISCUSS/DSCN MKR DOCD: CPT | Performed by: FAMILY MEDICINE

## 2021-04-21 PROCEDURE — G8399 PT W/DXA RESULTS DOCUMENT: HCPCS | Performed by: FAMILY MEDICINE

## 2021-04-21 PROCEDURE — 99214 OFFICE O/P EST MOD 30 MIN: CPT | Performed by: FAMILY MEDICINE

## 2021-04-21 PROCEDURE — G8427 DOCREV CUR MEDS BY ELIG CLIN: HCPCS | Performed by: FAMILY MEDICINE

## 2021-04-21 PROCEDURE — 4040F PNEUMOC VAC/ADMIN/RCVD: CPT | Performed by: FAMILY MEDICINE

## 2021-04-21 PROCEDURE — 1090F PRES/ABSN URINE INCON ASSESS: CPT | Performed by: FAMILY MEDICINE

## 2021-04-21 PROCEDURE — 3288F FALL RISK ASSESSMENT DOCD: CPT | Performed by: FAMILY MEDICINE

## 2021-04-21 RX ORDER — ATENOLOL 50 MG/1
TABLET ORAL
Qty: 90 TABLET | Refills: 3 | Status: SHIPPED | OUTPATIENT
Start: 2021-04-21 | End: 2022-08-12 | Stop reason: SDUPTHER

## 2021-04-21 RX ORDER — ATORVASTATIN CALCIUM 20 MG/1
TABLET, FILM COATED ORAL
Qty: 90 TABLET | Refills: 3 | Status: SHIPPED | OUTPATIENT
Start: 2021-04-21 | End: 2022-06-27

## 2021-04-21 RX ORDER — ALBUTEROL SULFATE 90 UG/1
2 AEROSOL, METERED RESPIRATORY (INHALATION) EVERY 6 HOURS PRN
Qty: 1 INHALER | Refills: 5 | Status: SHIPPED | OUTPATIENT
Start: 2021-04-21

## 2021-04-21 RX ORDER — QUETIAPINE FUMARATE 25 MG/1
25 TABLET, FILM COATED ORAL NIGHTLY
Qty: 90 TABLET | Refills: 1 | Status: SHIPPED | OUTPATIENT
Start: 2021-04-21 | End: 2021-08-19

## 2021-04-21 RX ORDER — HYDROCHLOROTHIAZIDE 25 MG/1
TABLET ORAL
Qty: 90 TABLET | Refills: 3 | Status: SHIPPED | OUTPATIENT
Start: 2021-04-21 | End: 2022-08-12 | Stop reason: SDUPTHER

## 2021-04-21 ASSESSMENT — PATIENT HEALTH QUESTIONNAIRE - PHQ9
6. FEELING BAD ABOUT YOURSELF - OR THAT YOU ARE A FAILURE OR HAVE LET YOURSELF OR YOUR FAMILY DOWN: 0
5. POOR APPETITE OR OVEREATING: 0
SUM OF ALL RESPONSES TO PHQ9 QUESTIONS 1 & 2: 6
10. IF YOU CHECKED OFF ANY PROBLEMS, HOW DIFFICULT HAVE THESE PROBLEMS MADE IT FOR YOU TO DO YOUR WORK, TAKE CARE OF THINGS AT HOME, OR GET ALONG WITH OTHER PEOPLE: 0
7. TROUBLE CONCENTRATING ON THINGS, SUCH AS READING THE NEWSPAPER OR WATCHING TELEVISION: 0
8. MOVING OR SPEAKING SO SLOWLY THAT OTHER PEOPLE COULD HAVE NOTICED. OR THE OPPOSITE, BEING SO FIGETY OR RESTLESS THAT YOU HAVE BEEN MOVING AROUND A LOT MORE THAN USUAL: 0

## 2021-04-21 ASSESSMENT — COLUMBIA-SUICIDE SEVERITY RATING SCALE - C-SSRS: 6. HAVE YOU EVER DONE ANYTHING, STARTED TO DO ANYTHING, OR PREPARED TO DO ANYTHING TO END YOUR LIFE?: NO

## 2021-04-21 NOTE — PATIENT INSTRUCTIONS
Start Seroquel at night for sleep. Call or send My Chart message about how she is doing in a few weeks. Monitor blood pressure 2-3 times a week and drop off readings for review in 1 month  Goal BP is <140/80 for many people, good control is <130/80        Covid Vaccine Scheduling  The following are resources to try to schedule your appointment for a Covid Vaccination. Vaccine availability changes day by day and hour by hour so the best way to increase your chances of getting a vaccine is to check with multiple places and check back often  daily if you can! Also, if you are willing to drive a bit you are more likely to find an appointment. You do not need to be a patient of a particular health system in order to schedule an appointment with them  all are welcome!    https://[email or web address]/vaccine-info/  (links to vaccine providers in the [facility] area)  https://gettheshot. coronavirus. [place].gov/ (The state website for vaccine sites)    [facility] on your [address] [employer].  Check availability on a pharmacy website like [employer], [employer], [facility] and [facility]. ([employer]'s website is the easiest to check. You need an account to check [facility] / [employer]. [facility] requires you to check each pharmacy site individually. [facility] lets you register and will contact you when they have appointments.)   Schedule through [facility] on your My Chart account or call [phone] M-F 8AM- 5PM   Check [facility] website. [employer] allows you to check availability and schedule online.  [email or web address]-[email or web address]/covid-19-vaccine/[email or web address]-scheduling   Schedule with  [employer] by calling [phone] ([phone]).  Schedule with [facility] by calling .  Schedule with [facility] (even adults, allows online scheduling) [email or web address]/schedule      [facility]!

## 2021-04-21 NOTE — PROGRESS NOTES
Patient is being seen Virtually using [facility]. me. Patient is at home (son is present) and Dr. Ines Lopes is at the home. The patient has consented to having a virtual visit due to the [place] 19 pandemic. Vitals are patient reported. Chief Complaint   Patient presents with    3 Month Follow-Up     discuss changing BP med. Here for f/u, hasn't been seen since Sept 2019. HTN- not checking bp at home. States thinks it has pretty good. Taking meds     Chol - taking reg, no SE. Psych- States has been feeling more depressed for past 2 months. Not sleeping well, states can't fall asleep or stay asleep. States she is depressed bc can't sleep. States won't take the risperdal bc it \"wipes her out. \"     States breathing is ok, uses albuterol prn, states she thinks she should use it more but doesn't because feels like it makes her cough. States she hasn't used the advair in years. After about 5 minutes pt became frustrated with call and handed phone to son and she walked off in house. Son says she has good days and bad. Still hearing voices, still talking to her dead NICOLE. Son says sometimes she is very irritable and will tell him to get the hell out and some days she is fine. Son lives 3 doors down and checks on her regularly. He states she is able to take care of her house, cleans it well, cooks, dresses herself, etc.       Wt Readings from Last 3 Encounters:   09/19/19 160 lb (72.6 kg)   09/12/19 176 lb (79.8 kg)   01/24/19 166 lb (75.3 kg)     There is no height or weight on file to calculate BMI. PHQ Scores 4/21/2021 9/19/2019 10/5/2018 4/16/2015   PHQ2 Score 6 0 0 0   PHQ9 Score 12 0 0 0     Patient-Reported Vitals 4/21/2021   Patient-Reported Weight 177.5lbs        Alert, oriented to person and place, somewhat irritable. ASSESSMENT AND PLAN:       Jeff Mcghee was seen today for 3 month follow-up.     Diagnoses and all orders for this visit:    Essential hypertension  -     Comprehensive Metabolic

## 2021-04-27 RX ORDER — TELMISARTAN 40 MG/1
TABLET ORAL
Qty: 90 TABLET | Refills: 3 | Status: SHIPPED | OUTPATIENT
Start: 2021-04-27 | End: 2022-06-16

## 2021-04-27 NOTE — TELEPHONE ENCOUNTER
Medication:   Requested Prescriptions      No prescriptions requested or ordered in this encounter      Last Filled:  4/19/21    Patient Phone Number: [phone] (home)     Last appt: 4/21/2021   Next appt: Visit date not found    Last OARRS: No flowsheet data found.   PDMP Monitoring:    Last PDMP Lauren Jacques as Reviewed [facility]):  Review User Review Instant Review Result          Preferred Pharmacy:   [place] [address] [phone] [address] [phone]  . [place] [address] [address]  Phone: [phone] Fax: [phone]

## 2021-06-10 ENCOUNTER — TELEPHONE (OUTPATIENT)
Dept: PHARMACY | Facility: CLINIC | Age: 81
End: 2021-06-10

## 2021-06-10 NOTE — TELEPHONE ENCOUNTER
[facility] CLINICAL PHARMACY REVIEW: ADHERENCE REVIEW  Identified care gap per [place]; fills at [facility]: ACE/ARB adherence    Last Visit: 04/21/2021 with PCP (virtual visit)     Patient also appears to be prescribed: atorvastatin. ASSESSMENT  ACE/ARB ADHERENCE    Per Insurance Records through 06/07/2021 ([facility] [place] = 64%; Potential Fail Date: 06/24/2021):   Telmisartan 40 mg daily last filled on 04/19/2021 for 30 day supply. Next refill due: 05/19/2021    Per Reconciled Dispense Report: Same as above    Per [address]:   Telmisartan last picked up on 04/23/2021 for 30 day supply. 3 refills remaining. Billed through [employer]. When contacted, [facility] stated that a 80 DS was already in process to be filled for the patient. BP Readings from Last 3 Encounters:   09/19/19 (!) 142/80   09/13/19 (!) 144/61   01/24/19 (!) 154/75     CrCl cannot be calculated (Patient's most recent lab result is older than the maximum 120 days allowed. ). [facility] ADHERENCE    Per Insurance Records through 06/07/2021 ([facility] [place] = 85%; Potential Fail Date: 07/24/2021): Atorvastatin 20 mg daily last filled on 02/18/2021 for 90 day supply. Next refill due: 05/19/2021    Per Reconciled Dispense Report: Same as above     Per [address]:   Atorvastatin last picked up on 02/19/2021 for 90 day supply. No refills remaining, but new prescriptions on file from March and April. Billed through [employer] Value Date    CHOL 159 09/14/2019    TRIG 66 09/14/2019    HDL 58 09/14/2019    LDLCALC 88 09/14/2019     ALT   Date Value Ref Range Status   09/12/2019 14 10 - 40 U/L Final     AST   Date Value Ref Range Status   09/12/2019 24 15 - 37 U/L Final     Comment:     Specimen hemolysis has exceeded the interference as defined by Roche. Value may be falsely increased. Suggest recollection if clinically  indicated.        The ASCVD Risk score (Roxana Guerrero, et al., 2013) failed to calculate for the following reasons: The 2013 ASCVD risk score is only valid for ages 36 to 78     PLAN  The following are interventions that have been identified:   - Patient overdue refilling atorvastatin and active on home medication list.     Tia Campbell was in the process of filling a 90 DS of telmisartan for patient when contacted. Will confirm this was picked up if able to reach patient. Attempting to reach patient to review.  Left message asking for return call. No future appointments.     Fabiano Valencia  PharmD Candidate 2022

## 2021-06-14 NOTE — TELEPHONE ENCOUNTER
[facility] CLINICAL PHARMACY REVIEW: ADHERENCE REVIEW    Second attempt to reach patient to review medication adherence. Unable to leave message on home phone due to full voicemail box. Man answered mobile phone and said we had the wrong number. Trixie Fitch has attempted to fill atorvastatin three times since it was last picked up on 2021 and patient has said she does not want it filled. Patient picked up Telmisartan today.      Theresa Thurston  PharmD Candidate     =======================================================    For Pharmacy Admin Tracking Only   Recommendation Provided To: Pharmacy: 1   Intervention Detail: Adherence Monitorin    Gap Closed?: Yes    Total # of Interventions Recommended: 1   Total # of Interventions Accepted: 0   Intervention Accepted By: Pharmacy: 0   Time Spent (min): 30

## 2021-08-19 RX ORDER — QUETIAPINE FUMARATE 25 MG/1
TABLET, FILM COATED ORAL
Qty: 90 TABLET | Refills: 1 | Status: SHIPPED | OUTPATIENT
Start: 2021-08-19

## 2021-08-19 NOTE — TELEPHONE ENCOUNTER
Medication:   Requested Prescriptions     Pending Prescriptions Disp Refills    QUEtiapine (SEROQUEL) 25 MG tablet [Pharmacy Med Name: QUEtiapine FUMARATE 25 MG TAB] 90 tablet 1     Sig: TAKE ONE TABLET BY MOUTH EVERY NIGHT AT BEDTIME          Patient Phone Number: [phone] (home)     Last appt: 4/21/2021   Next appt: Visit date not found    Last OARRS: No flowsheet data found.   PDMP Monitoring:    Last PDMP Mark Wells as Reviewed [facility] [place] [facility]):  Review User Review Instant Review Result          Preferred Pharmacy:   [address] [place] [address] [phone] [facility] [phone]  [place] [address] [address]  Phone: [phone] Fax: [phone]

## 2021-09-20 ENCOUNTER — TELEPHONE (OUTPATIENT)
Dept: PHARMACY | Facility: CLINIC | Age: 81
End: 2021-09-20

## 2021-10-19 ENCOUNTER — TELEPHONE (OUTPATIENT)
Dept: PHARMACY | Facility: CLINIC | Age: 81
End: 2021-10-19

## 2021-10-19 NOTE — LETTER
[place]  [address], [address]        [address] [place] [address]           10/20/21     Dear Jenny Nix,    We tried to reach you recently regarding your atorvastatin 20 mg tablets, but were unable to reach you on the telephone. We have on file that you are currently taking atorvastatin 20 mg tablets - Take one tablet by mouth daily. If you are no longer taking or taking differently, please call us at the number below so that we can discuss this and update your medication profile. It appears that this medication has not been filled at proper times. We are worried you might be missing doses or not taking it as directed. It is important that you take your medications regularly and try not to miss a single dose.     Some ways to help you remember to take and refill your medications are to:  · Use a pill box, set an alarm, and/or keep your medication near something that you do every day  · Fill a 3-month supply of your prescription at a time to save you time and trips to the pharmacy  if you would like assistance in switching your prescriptions to a 3-month supply, please contact us  · Ask your pharmacy if they participate in [facility]", a program where you can  all of your medications on the same day  · Ask your pharmacy if you can be set up with automatic refill, where they will automatically refill your prescription when it is due and let you know it's ready to     Sincerely,     [facility] Free Number: [phone], Option 1

## 2021-10-19 NOTE — TELEPHONE ENCOUNTER
[facility] CLINICAL PHARMACY REVIEW: ADHERENCE REVIEW  Identified care gap per [place]; fills at [facility]: ACE/ARB and Statin adherence    Last Visit: 4/21/21 (with PCP)    Patient found in Outcomes MTM and is not currently eligible for CMR/TIP    ASSESSMENT  ACE/ARB ADHERENCE    Per Insurance Records through 10/11/21 (2020 [facility] = n/a%; [place] = 77%; Potential Fail Date: 12/22/21): Telmisartan 40 mg tablets last filled on 9/14/21 for 90 day supply. Next refill due: 12/13/21    Per Reconciled Dispense Report:  Same as above     BP Readings from Last 3 Encounters:   09/19/19 (!) 142/80   09/17/19 105/73   09/13/19 (!) 144/61     CrCl cannot be calculated (Patient's most recent lab result is older than the maximum 120 days allowed. ). [facility]    Per Insurance Records through 10/11/21 (2020 [facility] = n/a%; YTD [place] = 70%; Potential Fail Date: 10/23/21): Atorvastatin 20 mg tablets last filled on 8/2/21 for 90 day supply. Next refill due: 10/31/21    Per Reconciled Dispense Report:  Same as above    Per [employer]:   Atorvastatin 20 mg last picked up on 8/9/21 for 90 day supply. 3 refills remaining. Billed through [employer]. Asked [employer] to get refill ready for patient - Will be for 90 day supply billed through [employer] and cost for patient will be $3.25. Lab Results   Component Value Date    CHOL 159 09/14/2019    TRIG 66 09/14/2019    HDL 58 09/14/2019    LDLCALC 88 09/14/2019     ALT   Date Value Ref Range Status   09/12/2019 14 10 - 40 U/L Final     AST   Date Value Ref Range Status   09/12/2019 24 15 - 37 U/L Final     Comment:     Specimen hemolysis has exceeded the interference as defined by Roche. Value may be falsely increased. Suggest recollection if clinically  indicated. The ASCVD Risk score (Salvador Munoz., et al., 2013) failed to calculate for the following reasons:     The 2013 ASCVD risk score is only valid for ages 36 to 78     PLAN  The following are interventions that have been identified:   Patient almost due for refill of atorvastatin 20 mg tablets. [facility]ked [facility] for 90 day supply - Will be $3.25 for patient. Attempting to reach patient to review.  Left message asking for return call. No future appointments.     Percy Patterson, PharmD   10/19/2021, 9:45 AM

## 2021-11-08 NOTE — TELEPHONE ENCOUNTER
Patient's atorvastatin 20 mg tablets are still ready for  at [address] [place] [address]. Second attempt made to contact patient. Left voice message to return call to [phone], option 1. Letter sent.     For Jose D Gayle in place:  No   Recommendation Provided To: Pharmacy: 1   Gap Closed?: No    Intervention Accepted By: Pharmacy: 1   Time Spent (min): [address], PharmCRISTIN   10/20/2021, 2:28 PM Successful AV node ablation using 3d mapping/conscious sedation performed      Plan:  DC amiodarone

## 2021-11-11 NOTE — TELEPHONE ENCOUNTER
[facility] CLINICAL PHARMACY REVIEW: ADHERENCE REVIEW  Identified care gap per [place]; fills at [address]: ACE/ARB and Statin adherence    Last Visit: 4/21/21    Patient not found in Outcomes MTM    ASSESSMENT  ACE/ARB ADHERENCE    Per Insurance Records through 9/7/21 ([facility] Harley Lee = 76%; Potential Fail Date: 9/23/21): Telmisartan 40mg daily last filled on 6/11/21 for 90 day supply. Next refill due: 9/9/21    Per chart, should have refills remaining    BP Readings from Last 3 Encounters:   09/19/19 (!) 142/80   09/17/19 105/73   09/13/19 (!) 144/61     CrCl cannot be calculated (Patient's most recent lab result is older than the maximum 120 days allowed. ). STATIN ADHERENCE    Per Insurance Records through 9/7/21 ( [facility] [place] = 66%; Failed in 2021): Atorvastatin 20mg daily last filled on 8/2/21 for 90 day supply. Next refill due: 10/31/21    Per chart, should have refills remaining    Lab Results   Component Value Date    CHOL 159 09/14/2019    TRIG 66 09/14/2019    HDL 58 09/14/2019    LDLCALC 88 09/14/2019     ALT   Date Value Ref Range Status   09/12/2019 14 10 - 40 U/L Final     AST   Date Value Ref Range Status   09/12/2019 24 15 - 37 U/L Final     Comment:     Specimen hemolysis has exceeded the interference as defined by Roche. Value may be falsely increased. Suggest recollection if clinically  indicated. The ASCVD Risk score (Michaelle Rodarte., et al., 2013) failed to calculate for the following reasons: The 2013 ASCVD risk score is only valid for ages 36 to 78     PLAN  The following are interventions that have been identified:   - Patient overdue refilling:  · Telmisartan - PDC 76%, refill due @now  · Atorvastatin has current supply, but PDC 66% and failed adherence measure this year    Attempting to reach patient/son to review.  Left message asking for return call. Spoke to [employer] - telmisartan 90-day supply picked up 5 days ago; billed to [facility] and refill remains.     Will close Discharge Planning Assessment  [facility]     Patient Name: Flo Willoughby  MRN: [id number]  Today's Date: 11/11/2021    Admit Date: 11/8/2021     Discharge Needs Assessment     Row Name 11/11/21 1339       Living Environment    Lives With parent(s)    Name(s) of Who Lives With Patient Mother    Current Living Arrangements home/apartment/condo    Duration at Residence 24 years    Potentially Unsafe Housing Conditions --  none    Primary Care Provided by self    Provides Primary Care For no one    Family Caregiver if Needed significant other; parent(s)    Family Caregiver Names Mother and Mary Jane, significant other    Quality of Family Relationships unable to assess       Resource/Environmental Concerns    Resource/Environmental Concerns none    Transportation Concerns car, none       Transition Planning    Patient/Family Anticipates Transition to home with family    Patient/Family Anticipated Services at Transition none    Transportation Anticipated family or friend will provide       Discharge Needs Assessment    Readmission Within the Last 30 Days no previous admission in last 30 days    Current Outpatient/Agency/Support Group --  none    Equipment Currently Used at Home none    Concerns to be Addressed discharge planning    Anticipated Changes Related to Illness none    Equipment Needed After Discharge oxygen; pulse ox    Outpatient/Agency/Support Group Needs --  none    Discharge Facility/Level of Care Needs --  none    Provided Post Acute Provider List? Refused    Refused Provider List Comment Pt declined    Provided Post Acute Provider Quality & Resource List? Refused    Refused Quality and Resource List Comment Pt declined    Current Discharge Risk --  none               Discharge Plan     Row Name 11/11/21 1341       Plan    Plan Discharge home with family    Plan Comments I spoke with the patient via phone with his permission. I introduced myself and explained my role as a . I verified the  encounter at this time since appears did have timely refill of telmisartan. Multiple previous attempts this year to reach patient also. No future appointments.     Darlyn Ramos, PharmD, [facility]  Department, toll free: [phone], option 1     =======================================================   For Pharmacy Admin Tracking Only     Gap Closed?: Yes    Time Spent (min): 10 information on the facesheet.  The [facility] on [facility] and is able to afford his medications and can pick them up. The patient denied having an advanced directive and declined paperwork regarding same. The patient lives in a 2 story home with his mother.  He has lived for 24 years.  He is able to enter and maneuver around with no issues.  He is independent with his ADL’s, has a car and drives.  The patient’s significant other, Mary Jane, will pick him up at discharge. The patient denied having or needed any DME at discharge. We discussed the potential for home oxygen need at discharge and the patient advised the [facility] is his preferred DME provider.  I offered information regarding home health and other community resources and the patient declined same.  The patient will discharge home with his mother to assist as needed and he is agreeable to that plan.  He had no further questions or concerns at this time.  CM will continue to follow for further needs.              Continued Care and Services - Admitted Since 11/8/2021    Coordination has not been started for this encounter.          Demographic Summary     Row Name 11/11/21 7105       General Information    Admission Type inpatient    Arrived From home    Referral Source admission list    Reason for Consult discharge planning    Preferred Language English     Used During This Interaction no       Contact Information    Permission Granted to Share Info With                Functional Status    No documentation.                Psychosocial    No documentation.                Abuse/Neglect    No documentation.                Legal    No documentation.                Substance Abuse    No documentation.                Patient Forms    No documentation.                   Reema Hampton RN

## 2021-12-16 ENCOUNTER — TELEPHONE (OUTPATIENT)
Dept: PHARMACY | Facility: CLINIC | Age: 81
End: 2021-12-16

## 2021-12-16 NOTE — LETTER
[place]  [address], [address]        [address] [address]        12/17/21    Dear Risa Garza    We tried to reach you recently regarding your Telmisartan prescriptions, but were unable to reach you on the telephone. We have on file that you are currently due for refills. If you are no longer taking or taking differently, please call us at the number below so that we can discuss this and update your medication profile. It appears that this medication has not been filled at proper times. We are worried you might be missing doses or not taking it as directed. It is important that you take your medications regularly and try not to miss a single dose. Some ways to help you remember to take and refill your medications are to:  · Use a pill box, set an alarm, and/or keep your medication near something that you do every day  · Fill a 3-month supply of your prescription at a time to save you time and trips to the pharmacy  if you would like assistance in switching your prescriptions to a 3-month supply, please contact us.   · Ask your pharmacy if they participate in [facility]", a program where you can  all of your medications on the same day  · Ask your pharmacy if you can be set up with automatic refill, where they will automatically refill your prescription when it is due and let you know it's ready to     Sincerely,       Stephania 2  Phone: toll free [phone] Option 1

## 2021-12-16 NOTE — TELEPHONE ENCOUNTER
[facility] CLINICAL PHARMACY REVIEW: ADHERENCE REVIEW  Identified care gap per [place]; fills at [address]: ACE/ARB and Statin adherence      Last Visit: 21    ASSESSMENT  ACE/ARB ADHERENCE  Per Insurance Records through 21 :   RX:  Telmisartan 40 mg tab  last filled on 21 for 90 day supply. Next refill due: 21. Fail Date:  21. Per Reconciled Dispense Report:  (same as above). Per Pharmacy: [address]  RX: Telmisartan 40 mg tab   last picked up on 09.15.21 for 90 day supply. Refills remainin  Billed through [employer]     BP Readings from Last 3 Encounters:   19 (!) 142/80   19 (!) 144/61   19 (!) 154/75     CrCl cannot be calculated (Patient's most recent lab result is older than the maximum 120 days allowed. ). STATIN ADHERENCE  Per Insurance Records through 21 :   RX:  Atorvastatin 20 mg tab  last filled on 21 for 90 day supply. Next refill due: 2022. Fail Date:  2022. Per Reconciled Dispense Report:  Last filled 21 for 90 day supply     Per Pharmacy: [address]  RX: Atorvastatin 20 mg tab   last picked up on 21 for 90 day supply. Refills remainin  Billed through [employer]   Component Value Date    CHOL 159 2019    TRIG 66 2019    HDL 58 2019    LDLCALC 88 2019     ALT   Date Value Ref Range Status   2019 14 10 - 40 U/L Final     AST   Date Value Ref Range Status   2019 24 15 - 37 U/L Final     Comment:     Specimen hemolysis has exceeded the interference as defined by Roche. Value may be falsely increased. Suggest recollection if clinically  indicated. The ASCVD Risk score (Chai Sandhu, et al., 2013) failed to calculate for the following reasons:     The 2013 ASCVD risk score is only valid for ages 36 to 78     PLAN  The following are interventions that have been identified:     - Per Pj, patient's Telmisartan refill is being processed now and should be ready for  tomorrow for a 90 day supply with 2 refills remaining. Attempting to reach patient to review; left message asking for return call. No future appointments.     Ardyce Cheadle, LPN  Population Health   [facility]  Phone: toll free [phone]

## 2021-12-17 NOTE — TELEPHONE ENCOUNTER
For Pharmacy [address] in place:  No   Recommendation Provided To: Patient/Caregiver: 1 via Telephone   Intervention Detail: Adherence Monitorin   Gap Closed?: Yes    Intervention Accepted By:   [facility] Time Spent (min): 15

## 2021-12-23 NOTE — TELEPHONE ENCOUNTER
[facility] CLINICAL PHARMACY REVIEW: ADHERENCE REVIEW    Outreach to pharmacy - telmisartan has not been picked up yet, it is filled and ready for pickup at pharmacy. Outreach to patient/caregiver - left message notifying patient's son that medication is ready for pickup at [address].      Sol Martinez, PharmD, BCACP, [address] [place] [address], toll free: [phone], option 1

## 2022-03-23 ENCOUNTER — TELEPHONE (OUTPATIENT)
Dept: PHARMACY | Facility: CLINIC | Age: 82
End: 2022-03-23

## 2022-03-24 NOTE — TELEPHONE ENCOUNTER
[facility] CLINICAL PHARMACY REVIEW: ADHERENCE REVIEW  Identified care gap per [place]; fills at [facility]: ACE/ARB and Statin adherence      Last Visit: 21    ASSESSMENT  ACE/ARB ADHERENCE  Per Insurance Records through 21 :   RX:  Telmisartan 40 mg tab   last filled on 21 for 90 day supply. Next refill due: 21. Per Reconciled Dispense Report:  Unavailable     Per Pharmacy: [facility]  last picked up on 22 for 90 day supply. Refills remainin  Billed through [employer] [facility]     BP Readings from Last 3 Encounters:   19 (!) 142/80   19 (!) 144/61   19 (!) 154/75     CrCl cannot be calculated (Patient's most recent lab result is older than the maximum 120 days allowed. ). STATIN ADHERENCE  Per Insurance Records through 21 :   RX:  Atorvastatin 20 mg tab  last filled on 21 for 90 day supply. Next refill due: 22. Per Reconciled Dispense Report:  Unavailable      Per Pharmacy: [facility]  last picked up on 03.10.22 for 90 day supply. Refills remainin  Billed through [employer]   Component Value Date    CHOL 159 2019    TRIG 66 2019    HDL 58 2019    LDLCALC 88 2019     ALT   Date Value Ref Range Status   2019 14 10 - 40 U/L Final     AST   Date Value Ref Range Status   2019 24 15 - 37 U/L Final     Comment:     Specimen hemolysis has exceeded the interference as defined by Roche. Value may be falsely increased. Suggest recollection if clinically  indicated. The ASCVD Risk score (Marilyn Murray., et al., 2013) failed to calculate for the following reasons: The 2013 ASCVD risk score is only valid for ages 36 to 78     PLAN  The following are interventions that have been identified:     - Patient current with refills. No patient outreached planned at this time. No future appointments.     Nicole Rosado LPN  Population Health   [address] [facility] [facility]  Phone: toll free [phone]

## 2022-03-24 NOTE — TELEPHONE ENCOUNTER
For Pharmacy [address] in place:  No   Intervention Detail: Adherence Monitorin   Gap Closed?: Yes    Time Spent (min): 15

## 2022-06-16 RX ORDER — TELMISARTAN 40 MG/1
TABLET ORAL
Qty: 90 TABLET | Refills: 0 | Status: SHIPPED | OUTPATIENT
Start: 2022-06-16 | End: 2022-08-12 | Stop reason: SDUPTHER

## 2022-06-16 NOTE — TELEPHONE ENCOUNTER
Medication:   Requested Prescriptions     Pending Prescriptions Disp Refills    telmisartan (MICARDIS) 40 MG tablet [Pharmacy Med Name: TELMISARTAN 40 MG TABLET] 90 tablet 3     Sig: TAKE ONE TABLET BY MOUTH DAILY      Last Filled:  4/27/2021    Patient Phone Number: [phone] (home)     Last appt: 4/21/2021   Next appt: Visit date not found    Last OARRS: No flowsheet data found.   PDMP Monitoring:    Last PDMP Laureen trevino Reviewed [facility]):  Review User Review Instant Review Result          Preferred Pharmacy:   [facility] [id number] [place] [address]  Phone: [phone] Fax: [phone]

## 2022-06-27 RX ORDER — ATORVASTATIN CALCIUM 20 MG/1
TABLET, FILM COATED ORAL
Qty: 90 TABLET | Refills: 3 | Status: SHIPPED | OUTPATIENT
Start: 2022-06-27

## 2022-06-27 NOTE — TELEPHONE ENCOUNTER
Medication:   Requested Prescriptions     Pending Prescriptions Disp Refills    atorvastatin (LIPITOR) 20 MG tablet [Pharmacy Med Name: ATORVASTATIN 20 MG TABLET] 90 tablet 3     Sig: TAKE ONE TABLET BY MOUTH DAILY          Patient Phone Number: [phone] (home)     Last appt: 4/21/2021   Next appt: Visit date not found    Last OARRS: No flowsheet data found.   PDMP Monitoring:    Last PDMP [facility] as Reviewed [facility]):  Review User Review Instant Review Result          Preferred Pharmacy:   [facility] [id number] [place] [id number]  [address] [place] [address]  Phone: [phone] Fax: [phone]

## 2022-07-05 ENCOUNTER — TELEPHONE (OUTPATIENT)
Dept: PHARMACY | Facility: CLINIC | Age: 82
End: 2022-07-05

## 2022-07-05 NOTE — TELEPHONE ENCOUNTER
[facility] CLINICAL PHARMACY: ADHERENCE REVIEW  Identified care gap per [employer]: fills at [facility]: ACE/ARB and Statin adherence    Last Visit: 04.21.21        ASSESSMENT  ACE/ARB ADHERENCE    Insurance Records claims through 06.14.22 (Prior Year Harley Richardsonandra = FAILED; YTD Harley Richardsonandra = Filled only once; Potential Fail Date: 07.11.22):   Telmisartan last filled on 02.05.22 for 90 day supply. Next refill due: 05.06.22    Per [employer] Portal:  Telmisartan last filled on 06.27.22 for 90 day supply. BP Readings from Last 3 Encounters:   09/19/19 (!) 142/80   09/13/19 (!) 144/61   01/24/19 (!) 154/75     CrCl cannot be calculated (Patient's most recent lab result is older than the maximum 180 days allowed. ). [address] Records claims through 06.14.22 (Prior Year Harley Richardsonandra = FAILED; YTD Harley Richardsonandra = Filled only once; Potential Fail Date: 08.05.22): Atorvastatin last filled on 03.09.22 for 90 day supply. Next refill due: 06.07.22    Per [employer] Portal:  Atorvastatin last filled on 06.16.22 for 90 day supply. Lab Results   Component Value Date    CHOL 159 09/14/2019    TRIG 66 09/14/2019    HDL 58 09/14/2019    LDLCALC 88 09/14/2019     ALT   Date Value Ref Range Status   09/12/2019 14 10 - 40 U/L Final     AST   Date Value Ref Range Status   09/12/2019 24 15 - 37 U/L Final     Comment:     Specimen hemolysis has exceeded the interference as defined by Roche. Value may be falsely increased. Suggest recollection if clinically  indicated. The ASCVD Risk score (Josiane Givens., et al., 2013) failed to calculate for the following reasons: The 2013 ASCVD risk score is only valid for ages 36 to 78     PLAN  The following are interventions that have been identified:   - Patient recently filled both Telmisartan & Atorvastatin. No patient out reach planned at this time. No future appointments.     Imani Ross, [address] [facility]  Phone: toll free [phone]

## 2022-07-28 NOTE — LETTER
[address]  [address], [address]  Phone: Omaira Treadwell [address] [address]           09/27/19     Dear Maite Stephens,    We tried to reach you recently regarding your TELMISARTAN TAB 40MG, but were unable to reach you on the telephone. It appears that this medication has not been filled at proper times. We are worried you might be missing doses or not taking it as directed. It is important that you take your medications regularly and try not to miss a single dose. We have on file that you are currently taking TELMISARTAN TAB 40MG. If you are no longer taking it or taking it differently than above, please call us at the number below so that we can discuss this and update your medication profile.     Some ways to help you remember to take and refill your medications are to:  · Use a pill box, set an alarm, and/or keep your medication near something that you do every day  · Fill a 3-month supply of your prescription at a time to save you time and trips to the pharmacy  if you would like assistance in switching your prescriptions to a 3-month supply, please contact us  · Ask your pharmacy if they participate in [facility]", a program where you can  all of your medications on the same day each month  · Ask your pharmacy if you can be set up with automatic refill, where they will automatically refill your prescription when it is due and let you know it's ready to     Sincerely,       [address]  Phone: [phone], option 7
No

## 2022-07-29 RX ORDER — TELMISARTAN 40 MG/1
TABLET ORAL
Qty: 90 TABLET | Refills: 0 | OUTPATIENT
Start: 2022-07-29

## 2022-08-09 NOTE — TELEPHONE ENCOUNTER
telmisartan (MICARDIS) 40 MG tablet 90 tablet 0 6/16/2022     Sig: TAKE ONE TABLET BY MOUTH DAILY - NEEDS APPT      hydroCHLOROthiazide (HYDRODIURIL) 25 MG tablet 90 tablet 3 4/21/2021     Sig: TAKE ONE TABLET BY MOUTH DAILY      atenolol (TENORMIN) 50 MG tablet 90 tablet 3 4/21/2021     Sig: TAKE ONE TABLET BY MOUTH DAILY    Pt has an appointment on 9/19/22 @9am     [facility] on Rem[place] in chart  Please advise

## 2022-08-12 RX ORDER — ATENOLOL 50 MG/1
TABLET ORAL
Qty: 90 TABLET | Refills: 0 | Status: SHIPPED | OUTPATIENT
Start: 2022-08-12

## 2022-08-12 RX ORDER — TELMISARTAN 40 MG/1
TABLET ORAL
Qty: 90 TABLET | Refills: 0 | Status: SHIPPED | OUTPATIENT
Start: 2022-08-12

## 2022-08-12 RX ORDER — HYDROCHLOROTHIAZIDE 25 MG/1
TABLET ORAL
Qty: 90 TABLET | Refills: 0 | Status: SHIPPED | OUTPATIENT
Start: 2022-08-12

## 2022-08-12 NOTE — TELEPHONE ENCOUNTER
Okay for this? Patient is scheduled for an appt. Medication:   Requested Prescriptions     Pending Prescriptions Disp Refills    atenolol (TENORMIN) 50 MG tablet 90 tablet 0     Sig: TAKE ONE TABLET BY MOUTH DAILY    hydroCHLOROthiazide (HYDRODIURIL) 25 MG tablet 90 tablet 0     Sig: TAKE ONE TABLET BY MOUTH DAILY    telmisartan (MICARDIS) 40 MG tablet 90 tablet 0     Sig: TAKE ONE TABLET BY MOUTH DAILY - NEEDS APPT      Last Filled:     Patient Phone Number: [phone] (home)     Last appt: 4/21/2021   Next appt: 9/19/2022    Last OARRS: No flowsheet data found.   PDMP Monitoring:    Last PDMP Major Revering as Reviewed [facility]):  Review User Review Instant Review Result          Preferred Pharmacy:   [facility] [id number]   [address] [address]  Phone: [phone] Fax: [phone]

## 2022-10-04 ENCOUNTER — TELEPHONE (OUTPATIENT)
Dept: PHARMACY | Facility: CLINIC | Age: 82
End: 2022-10-04

## 2022-10-04 NOTE — TELEPHONE ENCOUNTER
[facility] CLINICAL PHARMACY REVIEW: ADHERENCE  Identified care gap per [employer]: fills at [facility]: ACE/ARB adherence    Last Visit: 04/21/2021    Patient also appears to be prescribed: atorvastatin    ASSESSMENT  ACE/ARB ADHERENCE    Insurance Records claims through 09/24/2022 (GAYATHRI Lee =  77%; Potential Fail Date: 10/08/2022 ):   Telmisartan 40mg daily last filled on 06/16/2022 for 90 day supply. Next refill due: 09/14/2022    Per [employer] Portal: same as above    Last Rx sent on 08/12/2022 for 90ds with 0 refills    BP Readings from Last 3 Encounters:   09/19/19 (!) 142/80   09/17/19 105/73   09/13/19 (!) 144/61     Lab Results   Component Value Date    CREATININE 0.8 09/12/2019     CrCl cannot be calculated (Patient's most recent lab result is older than the maximum 180 days allowed. ). [address] Records claims through 09/24/2022 (YTD Harley Lee =  89%; Passed in 2022): Atorvastatin 20mg daily last filled on 09/24/2022 for 90 day supply. Next refill due: 12/23/2022    Per [employer] Portal: same as above    Lab Results   Component Value Date    CHOL 159 09/14/2019    TRIG 66 09/14/2019    HDL 58 09/14/2019    LDLCALC 88 09/14/2019     ALT   Date Value Ref Range Status   09/12/2019 14 10 - 40 U/L Final     AST   Date Value Ref Range Status   09/12/2019 24 15 - 37 U/L Final     Comment:     Specimen hemolysis has exceeded the interference as defined by Roche. Value may be falsely increased. Suggest recollection if clinically  indicated. The ASCVD Risk score (Salome DK, et al., 2019) failed to calculate for the following reasons: The 2019 ASCVD risk score is only valid for ages 36 to 78     PLAN  The following are interventions that have been identified:   - Patient overdue refilling telmisartan and active on home medication list.     Outreach to pharmacy - left message requesting they process telmisartan refill today. Will send letter to patient.     Future Appointments   Date Time Provider Cong Whittakeri   2022  3:30 PM Yodit Rossi MD [address], PharmD, [facility], [address] [facility]  Department, toll free: [phone], option 1    =======================================================    For Pharmacy Admin Tracking Only  Recommendation Provided To: Pharmacy: 1  Intervention Detail: Adherence Monitorin  Gap Closed?: No   Intervention Accepted By: Pharmacy: 0  Time Spent (min): 30

## 2022-10-04 NOTE — LETTER
[place]  [address], [address]  [place] [address]           10/07/22     Dear Skipper Links,    We have on file that you are currently taking telmisartan 40mg daily. If you are no longer taking or taking differently, please call us at the number below so that we can discuss this and update your medication profile. This medication is filled and ready for you at [place]. Please pick this medication up as soon as possible, if you have not already done so. It appears that this medication has not been filled at proper times. We are worried you might be missing doses or not taking it as directed. It is important that you take your medications regularly and try not to miss a single dose.     Some ways to help you remember to take and refill your medications are to:  · Use a pill box, set an alarm, and/or keep your medication near something that you do every day  · Ask your pharmacy if they participate in [facility]", a program where you can  all of your medications on the same day  · Ask your pharmacy if you can be set up with automatic refill, where they will automatically refill your prescription when it is due and let you know it's ready to     Sincerely,   Loli Ho, PharmD, BCACP, [facility], toll free: [phone], option 1

## 2022-11-22 RX ORDER — HYDROCHLOROTHIAZIDE 25 MG/1
TABLET ORAL
Qty: 90 TABLET | Refills: 0 | Status: SHIPPED | OUTPATIENT
Start: 2022-11-22

## 2022-11-22 RX ORDER — ATENOLOL 50 MG/1
TABLET ORAL
Qty: 90 TABLET | Refills: 0 | Status: SHIPPED | OUTPATIENT
Start: 2022-11-22

## 2022-11-22 NOTE — TELEPHONE ENCOUNTER
Medication:   Requested Prescriptions     Pending Prescriptions Disp Refills    atenolol (TENORMIN) 50 MG tablet [Pharmacy Med Name: ATENOLOL 50 MG TABLET] 90 tablet 0     Sig: TAKE ONE TABLET BY MOUTH DAILY    hydroCHLOROthiazide (HYDRODIURIL) 25 MG tablet [Pharmacy Med Name: hydroCHLOROthiazide 25 MG TABLET] 90 tablet 0     Sig: TAKE ONE TABLET BY MOUTH DAILY      Last Filled:      Patient Phone Number: [phone] (home)     Last appt: 4/21/2021   Next appt: 12/30/2022    Last OARRS: No flowsheet data found.   PDMP Monitoring:    Last PDMP Sterling Cha as Reviewed [facility]):  Review User Review Instant Review Result          [facility]   [facility]  [id number]   [address] [address]  Phone: [phone] Fax: [phone]

## 2023-01-30 RX ORDER — TELMISARTAN 40 MG/1
TABLET ORAL
Qty: 90 TABLET | Refills: 0 | Status: SHIPPED | OUTPATIENT
Start: 2023-01-30

## 2023-01-30 NOTE — TELEPHONE ENCOUNTER
Medication:   Requested Prescriptions     Pending Prescriptions Disp Refills    telmisartan (MICARDIS) 40 MG tablet 90 tablet 0     Sig: TAKE ONE TABLET BY MOUTH DAILY - NEEDS APPT          Patient Phone Number: [phone] (home)     Last appt: 4/21/2021   Next appt: 3/9/2023    Last OARRS: No flowsheet data found.  PDMP Monitoring:    Last PDMP Ilia as Reviewed ([place]):  Review User Review Instant Review Result          Preferred Pharmacy:   [facility] [id number] - [place], [place] - [address]  DENI [phone] -  [phone]  [address]  [facility] [address]  Phone: [phone] Fax: [phone]

## 2023-01-30 NOTE — TELEPHONE ENCOUNTER
----- Message from Paras Marion sent at 1/30/2023  9:58 AM EST -----  Subject: Refill Request    QUESTIONS  Name of Medication? telmisartan (MICARDIS) 40 MG tablet  Patient-reported dosage and instructions? TAKE ONE TABLET BY MOUTH DAILY -   NEEDS APPT  How many days do you have left? 0  Preferred Pharmacy? [facility] [id number]  Pharmacy phone number (if available)? [phone]  ---------------------------------------------------------------------------  --------------  Percy MARTIN  What is the best way for the office to contact you? OK to leave message on   voicemail  Preferred Call Back Phone Number? [phone]  ---------------------------------------------------------------------------  --------------  SCRIPT ANSWERS  Relationship to Patient? Other  Representative Name? Deon Byrd  Is the Representative on the appropriate HIPAA document in Epic?  Yes

## 2023-03-07 NOTE — PROGRESS NOTES
[facility] VISIT    Patient is here for their Medicare Annual Wellness Visit. Has not been seen in 2 years. Additional history given by son Gerri Kay who I talked with after visit. Pt obviously delusional and paranoid during visit today. States she doesn't recognize me, that she doesn't believe I'm Dr. Roderick Shaw, that the Dr. Roderick Shaw she remembers was meaner and had bigger legs. Last eye exam: aware she is due  Last dental exam: it's been awhile, planning on making appointment  Exercise: home exercise bike  Diet: on average, 3 meals per day, with diet in the middle    How would you rate your overall health? : Fair        Fall Risk 3/9/2023 2021 2019 10/5/2018 3/31/2017 3/24/2016 2015   2 or more falls in past year? no no no no no no no   Fall with injury in past year? no no no no yes yes no       PHQ Scores 3/9/2023 2021 2019 10/5/2018 2015   PHQ2 Score 6 6 0 0 0   PHQ9 Score 15 12 0 0 0     States \"I'm depressed. I wear pajamas all day because I'm depressed. \" States \"the game and [employer] are causing my depression. I can't get out of the game until I move. \" States she is going to move to a house that her old gynecologist gave her \"He told me I had a nice vagina. \"      Do you always wear a seat belt in the car?: Yes      Have you noted any problems with hearing?: Yes - States \"Yemi has my ears blocked\" (her NICOLE who is )  Have you noted any vision problems?: Yes - states   Do you have concerns about your sexual health?: no  In the past month how much has pain been an issue for you?:  Not at all  In the past month have you had issues with anxiety, loneliness, irritability or fatigue:  Quite a bit    Do you take opioid medications even sometimes?  No     Living Will: No,   Additional information provided      Healthcare Decision Maker:    Primary Decision Maker (Active): ReynaBoyd - Child - [phone]  Click here to complete Healthcare Decision Makers including selection of the Healthcare Decision Maker Relationship (ie \"Primary\"). Today we documented Decision Maker(s). The patient will provide ACP documents. Who lives at home with you: no one, Son Tanya Shaw lives across the street and checks on her regularly. Do you have any pets? None - just lost her dog of 16 years. Do you have any services coming to your home (meals on wheels, home health, etc) ?: grandson and son help      Do you need help with:  Using the phone:  No  Bathing: No  Dressing:  No  Toileting: No  Transportation:  Yes: son drives her around  Shopping: Yes, son does  Preparing meals: No  Housework/Laundry: No - \"states I don't clean anything, I'm waiting to move and I won't clean until I move. \" Per son Tanya Shaw her house is spotless  Medications: No  Money management: No    Does your home have:  Unsecured throw rugs: No  Grab bars in bathroom: No  Walk in shower: No, in process of trying to move (Per son she is not moving)  Seat in shower: No  Lit pathways for night (nightlights): No  Lifeline Alert System: No       Memory:  Have you or anyone close to you expressed concerns about your memory: No    Knows:  Month: Yes  Day: Yes  Year: Yes  Day of Week: Yes  Able to Recall (house, banana, cup) : Did not do given patient's paranoia about being in a game, worried would make her more paranoid. Patient history:   Patient's medications, allergies, past medical, surgical, social and family histories were reviewed and updated in the EHR under History. Social History     Substance and Sexual Activity   Alcohol Use No    Alcohol/week: 0.0 standard drinks       Social History     Substance and Sexual Activity   Drug Use No       Social History     Tobacco Use   Smoking Status Never   Smokeless Tobacco Never       AMB SDOH 3/9/2023   How hard is it for you to pay for the very basics like food, housing, medical care, and heating?  Not hard at all   In the last 12 months, was there a time when you did not have a steady place to sleep or slept in a shelter (including now)? No   In the past 12 months, has lack of transportation kept you from meetings, work, or from getting things needed for daily living? No   Within the past 12 months, you worried that your food would run out before you got the money to buy more. Never true   Within the past 12 months, the food you bought just didn't last and you didn't have money to get more. Never true           Care Team:  Patient's list of care team members was updated in EHR under the Snap Shot. Immunizations: Reviewed with patient. Health Maintenance Due   Topic Date Due    COVID-19 Vaccine (1) Never done    DTaP/Tdap/Td vaccine (1 - Tdap) Never done    Shingles vaccine (1 of 2) Never done    Lipids  09/14/2020    Flu vaccine (1) 08/01/2022       CHRONIC CONDITIONS / ACUTE COMPLAINTS      Chris Mendes is a 81 y.o. female who presents for follow-up of HTN. Checks BP at home: No  BP numbers: NA  Taking medication daily: Yes (telmisartan, HCTZ, and tenormin )  Side Effects of medication: no      States breathing is ok. Doesn't use inhaler. States does cough a lot      C/o being very gassy and wants to know what can take for that. Pt very concerned about \"the game\" she is in, states Yemi is keeping her in the game, states her neighbors are part of it as well and listen in on her. Albert Sandoval is her dead brother in law. He talks to her and plugs her ears and makes it hard for her to hear. Physical Exam:    Body mass index is 31.82 kg/m². Vitals:    03/09/23 1317   BP: 134/76   Site: Left Upper Arm   Position: Sitting   Cuff Size: Medium Adult   Pulse: 68   Temp: 97.5 °F (36.4 °C)   TempSrc: Infrared   SpO2: 98%   Weight: 185 lb 6.4 oz (84.1 kg)   Height: 5' 4\" (1.626 m)     Wt Readings from Last 3 Encounters:   03/09/23 185 lb 6.4 oz (84.1 kg)   09/19/19 160 lb (72.6 kg)   09/12/19 176 lb (79.8 kg)       GENERAL:Alert and oriented to person and place and time.  Knows at  Alicia's office but does not believe I'm Dr. Vinnie Lowery and that I'm part of \"the game\" that Ishmael Harman has her in. .  NAD, well hydrated, well developed. LUNG:clear to auscultation bilaterally with normal respiratory effort  CV: Normal heart sounds, regular rate and rhythm without murmurs  EXTREMETY: no edema, normal pedal pulses bilaterally    Was the timed get up and go unsteady or longer than 20 seconds: No      Assessment/Plan:    Diane Gonzalez was seen today for medicare awv. Diagnoses and all orders for this visit:    Well adult exam  Recommended screenings discussed and ordered if patient agreed  Recommended vaccinations discussed   Encouraged healthy diet   Encouraged regular exercise and maintaining a healthy weight    Medicare Safety Interventions: Home safety tips provided  Individualized [address] [place] [address] Avenue included in patient instructions and AVS      Primary hypertension  -     Lipid Panel; Future  -     Comprehensive Metabolic Panel; Future  -Stable, continue current medications. Chronic obstructive pulmonary disease, unspecified COPD type ([address]  -Stable, continue current medications. Acquired hypothyroidism  -     TSH with Reflex; Future  -Stable, continue current medications. Schizophreniform disorder ([facility] [address]  Obviously delusional and paranoid. Pt has been stable this way for many years. Has close good support by family. Per son able to take care of herself and her home. Pt had many complaints about not sleeping but was very suspicious about any medication I recommended and declined. Pt very aware of seroquel and risperdal and that they are for schizophrenia. Brings up that she has been diagnosed with this but smirks as she says it like she doesn't believe it. Declines to take any medication. Other orders  -     telmisartan (MICARDIS) 40 MG tablet; TAKE ONE TABLET BY MOUTH DAILY  -     atenolol (TENORMIN) 50 MG tablet; TAKE ONE TABLET BY MOUTH DAILY - Patient requests name brand medication.   - hydroCHLOROthiazide (HYDRODIURIL) 25 MG tablet; TAKE ONE TABLET BY MOUTH DAILY  -     atorvastatin (LIPITOR) 20 MG tablet; TAKE ONE TABLET BY MOUTH DAILY  -     albuterol sulfate HFA (PROAIR HFA) 108 (90 Base) MCG/ACT inhaler; Inhale 2 puffs into the lungs every 6 hours as needed for Wheezing          Return in about 1 year (around 3/9/2024) for AWV, 30 min.            Portions of Note per  Charlotte Tim CMA AAMA with corrections and edits per Tony Matthew MD.  I agree with entirety of note and was present and performed history and physical.  I also confirm that the note above accurately reflects all work, treatment, procedures, and medical decision making performed by me, Tony Matthew MD

## 2023-03-09 ENCOUNTER — OFFICE VISIT (OUTPATIENT)
Dept: FAMILY MEDICINE CLINIC | Age: 83
End: 2023-03-09

## 2023-03-09 VITALS
SYSTOLIC BLOOD PRESSURE: 134 MMHG | BODY MASS INDEX: 31.65 KG/M2 | DIASTOLIC BLOOD PRESSURE: 76 MMHG | TEMPERATURE: 97.5 F | HEIGHT: 64 IN | HEART RATE: 68 BPM | WEIGHT: 185.4 LBS | OXYGEN SATURATION: 98 %

## 2023-03-09 DIAGNOSIS — E03.9 ACQUIRED HYPOTHYROIDISM: ICD-10-CM

## 2023-03-09 DIAGNOSIS — I10 PRIMARY HYPERTENSION: ICD-10-CM

## 2023-03-09 DIAGNOSIS — Z00.00 WELL ADULT EXAM: Primary | ICD-10-CM

## 2023-03-09 DIAGNOSIS — J44.9 CHRONIC OBSTRUCTIVE PULMONARY DISEASE, UNSPECIFIED COPD TYPE (HCC): ICD-10-CM

## 2023-03-09 DIAGNOSIS — F20.81 SCHIZOPHRENIFORM DISORDER (HCC): ICD-10-CM

## 2023-03-09 RX ORDER — HYDROCHLOROTHIAZIDE 25 MG/1
TABLET ORAL
Qty: 90 TABLET | Refills: 3 | Status: SHIPPED | OUTPATIENT
Start: 2023-03-09

## 2023-03-09 RX ORDER — ATORVASTATIN CALCIUM 20 MG/1
TABLET, FILM COATED ORAL
Qty: 90 TABLET | Refills: 3 | Status: SHIPPED | OUTPATIENT
Start: 2023-03-09

## 2023-03-09 RX ORDER — ATENOLOL 50 MG/1
TABLET ORAL
Qty: 90 TABLET | Refills: 3 | Status: SHIPPED | OUTPATIENT
Start: 2023-03-09

## 2023-03-09 RX ORDER — ALBUTEROL SULFATE 90 UG/1
2 AEROSOL, METERED RESPIRATORY (INHALATION) EVERY 6 HOURS PRN
Qty: 1 EACH | Refills: 5 | Status: SHIPPED | OUTPATIENT
Start: 2023-03-09

## 2023-03-09 RX ORDER — TELMISARTAN 40 MG/1
TABLET ORAL
Qty: 90 TABLET | Refills: 3 | Status: SHIPPED | OUTPATIENT
Start: 2023-03-09

## 2023-03-09 SDOH — ECONOMIC STABILITY: FOOD INSECURITY: WITHIN THE PAST 12 MONTHS, YOU WORRIED THAT YOUR FOOD WOULD RUN OUT BEFORE YOU GOT MONEY TO BUY MORE.: NEVER TRUE

## 2023-03-09 SDOH — ECONOMIC STABILITY: HOUSING INSECURITY
IN THE LAST 12 MONTHS, WAS THERE A TIME WHEN YOU DID NOT HAVE A STEADY PLACE TO SLEEP OR SLEPT IN A SHELTER (INCLUDING NOW)?: NO

## 2023-03-09 SDOH — ECONOMIC STABILITY: INCOME INSECURITY: HOW HARD IS IT FOR YOU TO PAY FOR THE VERY BASICS LIKE FOOD, HOUSING, MEDICAL CARE, AND HEATING?: NOT HARD AT ALL

## 2023-03-09 SDOH — ECONOMIC STABILITY: FOOD INSECURITY: WITHIN THE PAST 12 MONTHS, THE FOOD YOU BOUGHT JUST DIDN'T LAST AND YOU DIDN'T HAVE MONEY TO GET MORE.: NEVER TRUE

## 2023-03-09 ASSESSMENT — PATIENT HEALTH QUESTIONNAIRE - PHQ9
SUM OF ALL RESPONSES TO PHQ9 QUESTIONS 1 & 2: 6
1. LITTLE INTEREST OR PLEASURE IN DOING THINGS: 3
3. TROUBLE FALLING OR STAYING ASLEEP: 3
SUM OF ALL RESPONSES TO PHQ QUESTIONS 1-9: 15
SUM OF ALL RESPONSES TO PHQ QUESTIONS 1-9: 15
10. IF YOU CHECKED OFF ANY PROBLEMS, HOW DIFFICULT HAVE THESE PROBLEMS MADE IT FOR YOU TO DO YOUR WORK, TAKE CARE OF THINGS AT HOME, OR GET ALONG WITH OTHER PEOPLE: 3
6. FEELING BAD ABOUT YOURSELF - OR THAT YOU ARE A FAILURE OR HAVE LET YOURSELF OR YOUR FAMILY DOWN: 3
4. FEELING TIRED OR HAVING LITTLE ENERGY: 3
9. THOUGHTS THAT YOU WOULD BE BETTER OFF DEAD, OR OF HURTING YOURSELF: 0
SUM OF ALL RESPONSES TO PHQ QUESTIONS 1-9: 15
SUM OF ALL RESPONSES TO PHQ QUESTIONS 1-9: 15
7. TROUBLE CONCENTRATING ON THINGS, SUCH AS READING THE NEWSPAPER OR WATCHING TELEVISION: 0
8. MOVING OR SPEAKING SO SLOWLY THAT OTHER PEOPLE COULD HAVE NOTICED. OR THE OPPOSITE, BEING SO FIGETY OR RESTLESS THAT YOU HAVE BEEN MOVING AROUND A LOT MORE THAN USUAL: 0
2. FEELING DOWN, DEPRESSED OR HOPELESS: 3
5. POOR APPETITE OR OVEREATING: 0

## 2023-03-09 NOTE — PATIENT INSTRUCTIONS
--Check with the pharmacy about getting the Tdap (tetanus, diptheria and pertussis) vaccine. --Check with pharmacy about getting the shingles vaccine, Shingrix (not Zostavax)      --Make appointment to see the dentist     --Make appointment to see the eye doctor     --Bring in copy of living will and healthcare power of  for your chart. --Try GasX or other simethicone tablets for gas.

## 2024-03-11 RX ORDER — ATORVASTATIN CALCIUM 20 MG/1
TABLET, FILM COATED ORAL
Qty: 90 TABLET | Refills: 3 | Status: SHIPPED | OUTPATIENT
Start: 2024-03-11

## 2024-03-11 NOTE — TELEPHONE ENCOUNTER
Medication:   Requested Prescriptions     Pending Prescriptions Disp Refills    atorvastatin (LIPITOR) 20 MG tablet [Pharmacy Med Name: ATORVASTATIN 20 MG TABLET] 90 tablet 3     Sig: TAKE ONE TABLET BY MOUTH DAILY          Patient Phone Number: [phone] (home)     Last appt: 3/9/2023   Next appt: Visit date not found    Last OARRS:        No data to display              PDMP Monitoring:    Last PDMP Ilia as Reviewed ([place]):  Review User Review Instant Review Result          Preferred Pharmacy:   [facility] [id number] - [place], [place] - [address] [phone] -  [phone]  [address]  [facility] [address]  Phone: [phone] Fax: [phone]

## 2024-04-16 RX ORDER — HYDROCHLOROTHIAZIDE 25 MG/1
TABLET ORAL
Qty: 90 TABLET | Refills: 0 | Status: SHIPPED | OUTPATIENT
Start: 2024-04-16

## 2024-04-16 RX ORDER — TELMISARTAN 40 MG/1
TABLET ORAL
Qty: 90 TABLET | Refills: 0 | Status: SHIPPED | OUTPATIENT
Start: 2024-04-16

## 2024-04-16 RX ORDER — ATENOLOL 50 MG/1
TABLET ORAL
Qty: 90 TABLET | Refills: 0 | Status: SHIPPED | OUTPATIENT
Start: 2024-04-16

## 2024-04-16 NOTE — TELEPHONE ENCOUNTER
Medication:   Requested Prescriptions     Pending Prescriptions Disp Refills    hydroCHLOROthiazide (HYDRODIURIL) 25 MG tablet [Pharmacy Med Name: hydroCHLOROthiazide 25 MG TABLET] 90 tablet 3     Sig: TAKE ONE TABLET BY MOUTH DAILY    telmisartan (MICARDIS) 40 MG tablet [Pharmacy Med Name: TELMISARTAN 40 MG TABLET] 90 tablet 3     Sig: TAKE ONE TABLET BY MOUTH DAILY    atenolol (TENORMIN) 50 MG tablet [Pharmacy Med Name: ATENOLOL 50 MG TABLET] 90 tablet 3     Sig: TAKE ONE TABLET BY MOUTH DAILY          Patient Phone Number: [phone] (home)     Last appt: 3/9/2023   Next appt: Visit date not found    Last OARRS:        No data to display              PDMP Monitoring:    Last PDMP Ilia as Reviewed ([place]):  Review User Review Instant Review Result          Preferred Pharmacy:   [facility] [id number] [place], [place] - [address] [phone] -  [phone]  [address]  [facility] [address]  Phone: [phone] Fax: [phone]

## 2024-07-17 RX ORDER — ATENOLOL 50 MG/1
TABLET ORAL
Qty: 90 TABLET | Refills: 0 | Status: SHIPPED | OUTPATIENT
Start: 2024-07-17

## 2024-07-17 RX ORDER — HYDROCHLOROTHIAZIDE 25 MG/1
TABLET ORAL
Qty: 90 TABLET | Refills: 0 | Status: SHIPPED | OUTPATIENT
Start: 2024-07-17

## 2024-07-17 RX ORDER — TELMISARTAN 40 MG/1
TABLET ORAL
Qty: 90 TABLET | Refills: 0 | OUTPATIENT
Start: 2024-07-17

## 2024-07-17 NOTE — TELEPHONE ENCOUNTER
Medication:   Requested Prescriptions     Pending Prescriptions Disp Refills    atenolol (TENORMIN) 50 MG tablet 90 tablet 0     Sig: TAKE ONE TABLET BY MOUTH DAILY - NEEDS APPT BEFORE ANY MORE REFILLS!    hydroCHLOROthiazide (HYDRODIURIL) 25 MG tablet 90 tablet 0     Sig: TAKE ONE TABLET BY MOUTH DAILY - NEEDS APPT BEFORE ANY MORE REFILLS!     Refused Prescriptions Disp Refills    telmisartan (MICARDIS) 40 MG tablet [Pharmacy Med Name: TELMISARTAN 40 MG TABLET] 90 tablet 0     Sig: TAKE 1 TABLET BY MOUTH DAILY     Refused By: CHARLY CORNEJO     Reason for Refusal: Patient needs an appointment          Patient Phone Number: [phone] (home)     Last appt: 3/9/2023   Next appt: 8/5/2024    Last OARRS:        No data to display              PDMP Monitoring:    Last PDMP Ilia as Reviewed ([place]):  Review User Review Instant Review Result          Preferred Pharmacy:   [facility] [id number] - [place], [place] - [address] [phone] -  [phone]  [address]  [facility] [address]  Phone: [phone] Fax: [phone]

## 2024-07-17 NOTE — TELEPHONE ENCOUNTER
atenolol (TENORMIN) 50 MG tablet     hydroCHLOROthiazide (HYDRODIURIL) 25 MG tablet        [facility] [id number] - [place], [place] - [address] - [place] [phone] - F [phone]  [address] [facility] [address]  Phone: [phone]  Fax: [phone]     [facility] 8/5

## 2024-08-02 NOTE — PROGRESS NOTES
MEDICARE ANNUAL WELLNESS VISIT    Patient is here for their Medicare Annual Wellness Visit.  Wants to discuss her medications.  She is wanting to go to brand name vs generic if possible.  Wants to discuss neck xray done 7/21/24 at [facility].    Last eye exam: over due  Last dental exam: over due  Exercise:   patient refused answer on questionnaire , activities of daily living only  Do you eat balanced/healthy meals regularly? Yes    How would you rate your overall health? : Fair            8/5/2024    12:29 PM 3/9/2023     1:12 PM 4/21/2021    11:20 AM 9/19/2019    11:22 AM 10/5/2018     2:43 PM 3/31/2017     2:15 PM 3/24/2016     9:09 AM   Fall Risk   Do you feel unsteady or are you worried about falling?  no yes        2 or more falls in past year? no no no no no no no   Fall with injury in past year? no no no no no yes yes           8/5/2024    12:29 PM 3/9/2023     1:13 PM 4/21/2021    11:20 AM 9/19/2019    11:21 AM 10/5/2018     2:43 PM 4/16/2015     9:20 AM   PHQ Scores   PHQ2 Score 0 6 6 0 0 0   PHQ9 Score 0 15 12 0 0 0         Do you always wear a seat belt in the car?: Yes      Have you noted any problems with hearing?: No  Have you noted any vision problems?: No  Do you have concerns about your sexual health?: no  In the past month how much has pain been an issue for you?:  Not at all  In the past month have you had issues with anxiety, loneliness, irritability or fatigue: Very Much.  States she stays in her bed and goes no where or does anything for cleaning  Do you take opioid medications even sometimes? No     Living Will and/or Healthcare POA: Yes,   Copy requested      Healthcare Decision Maker:    Primary Decision Maker (Active): Boyd Orellana - Child - [phone]           Who lives at home with you: no one  Do you have any pets? none  Do you have any services coming to your home (meals on wheels, home health, etc) ?: no, son lives a couple doors down, checks on her daily.       Do you need help

## 2024-08-05 ENCOUNTER — OFFICE VISIT (OUTPATIENT)
Dept: FAMILY MEDICINE CLINIC | Age: 84
End: 2024-08-05

## 2024-08-05 VITALS
WEIGHT: 185.4 LBS | DIASTOLIC BLOOD PRESSURE: 70 MMHG | HEIGHT: 64 IN | TEMPERATURE: 97.2 F | HEART RATE: 69 BPM | SYSTOLIC BLOOD PRESSURE: 110 MMHG | OXYGEN SATURATION: 97 % | BODY MASS INDEX: 31.65 KG/M2

## 2024-08-05 DIAGNOSIS — Z00.00 WELL ADULT EXAM: Primary | ICD-10-CM

## 2024-08-05 DIAGNOSIS — I10 PRIMARY HYPERTENSION: ICD-10-CM

## 2024-08-05 DIAGNOSIS — J44.9 CHRONIC OBSTRUCTIVE PULMONARY DISEASE, UNSPECIFIED COPD TYPE (HCC): ICD-10-CM

## 2024-08-05 DIAGNOSIS — E03.9 ACQUIRED HYPOTHYROIDISM: ICD-10-CM

## 2024-08-05 DIAGNOSIS — M50.30 DDD (DEGENERATIVE DISC DISEASE), CERVICAL: ICD-10-CM

## 2024-08-05 DIAGNOSIS — F20.81 SCHIZOPHRENIFORM DISORDER (HCC): ICD-10-CM

## 2024-08-05 RX ORDER — ATORVASTATIN CALCIUM 20 MG/1
20 TABLET, FILM COATED ORAL DAILY
Qty: 90 TABLET | Refills: 3 | Status: SHIPPED | OUTPATIENT
Start: 2024-08-05

## 2024-08-05 RX ORDER — LIDOCAINE 50 MG/G
1 PATCH TOPICAL EVERY 24 HOURS
COMMUNITY
Start: 2024-07-21

## 2024-08-05 RX ORDER — HYDROCHLOROTHIAZIDE 25 MG/1
25 TABLET ORAL DAILY
Qty: 90 TABLET | Refills: 3 | Status: SHIPPED | OUTPATIENT
Start: 2024-08-05

## 2024-08-05 RX ORDER — TELMISARTAN 40 MG/1
40 TABLET ORAL DAILY
Qty: 90 TABLET | Refills: 3 | Status: SHIPPED | OUTPATIENT
Start: 2024-08-05

## 2024-08-05 RX ORDER — ATENOLOL 50 MG/1
50 TABLET ORAL DAILY
Qty: 90 TABLET | Refills: 3 | Status: SHIPPED | OUTPATIENT
Start: 2024-08-05

## 2024-08-05 RX ORDER — METHOCARBAMOL 750 MG/1
750 TABLET, FILM COATED ORAL 3 TIMES DAILY PRN
COMMUNITY
Start: 2024-07-21

## 2024-08-05 RX ORDER — ARIPIPRAZOLE 2 MG/1
2 TABLET ORAL NIGHTLY
Qty: 30 TABLET | Refills: 3 | Status: SHIPPED | OUTPATIENT
Start: 2024-08-05

## 2024-08-05 SDOH — ECONOMIC STABILITY: FOOD INSECURITY: WITHIN THE PAST 12 MONTHS, YOU WORRIED THAT YOUR FOOD WOULD RUN OUT BEFORE YOU GOT MONEY TO BUY MORE.: NEVER TRUE

## 2024-08-05 SDOH — HEALTH STABILITY: PHYSICAL HEALTH
ON AVERAGE, HOW MANY DAYS PER WEEK DO YOU ENGAGE IN MODERATE TO STRENUOUS EXERCISE (LIKE A BRISK WALK)?: PATIENT DECLINED

## 2024-08-05 SDOH — ECONOMIC STABILITY: TRANSPORTATION INSECURITY
IN THE PAST 12 MONTHS, HAS LACK OF TRANSPORTATION KEPT YOU FROM MEETINGS, WORK, OR FROM GETTING THINGS NEEDED FOR DAILY LIVING?: NO

## 2024-08-05 SDOH — ECONOMIC STABILITY: FOOD INSECURITY: WITHIN THE PAST 12 MONTHS, THE FOOD YOU BOUGHT JUST DIDN'T LAST AND YOU DIDN'T HAVE MONEY TO GET MORE.: NEVER TRUE

## 2024-08-05 SDOH — ECONOMIC STABILITY: INCOME INSECURITY: HOW HARD IS IT FOR YOU TO PAY FOR THE VERY BASICS LIKE FOOD, HOUSING, MEDICAL CARE, AND HEATING?: NOT HARD AT ALL

## 2024-08-05 ASSESSMENT — PATIENT HEALTH QUESTIONNAIRE - PHQ9
2. FEELING DOWN, DEPRESSED OR HOPELESS: NOT AT ALL
SUM OF ALL RESPONSES TO PHQ QUESTIONS 1-9: 0
SUM OF ALL RESPONSES TO PHQ QUESTIONS 1-9: 0
1. LITTLE INTEREST OR PLEASURE IN DOING THINGS: NOT AT ALL
SUM OF ALL RESPONSES TO PHQ QUESTIONS 1-9: 0
SUM OF ALL RESPONSES TO PHQ QUESTIONS 1-9: 0
SUM OF ALL RESPONSES TO PHQ9 QUESTIONS 1 & 2: 0

## 2024-08-05 ASSESSMENT — LIFESTYLE VARIABLES
HOW MANY STANDARD DRINKS CONTAINING ALCOHOL DO YOU HAVE ON A TYPICAL DAY: 0
HOW MANY STANDARD DRINKS CONTAINING ALCOHOL DO YOU HAVE ON A TYPICAL DAY: PATIENT DOES NOT DRINK
HOW OFTEN DO YOU HAVE SIX OR MORE DRINKS ON ONE OCCASION: 1
HOW OFTEN DO YOU HAVE A DRINK CONTAINING ALCOHOL: NEVER
HOW OFTEN DO YOU HAVE A DRINK CONTAINING ALCOHOL: 1

## 2024-08-05 NOTE — PATIENT INSTRUCTIONS
--Trial Claritn (loratadine) or Zyrtec (cetirizine)     --Get your Covid vaccine this fall.      --Make sure you get your flu shot this fall. If you are over 65 look for the \"high dose\" flu shot for better protection.     --Check with the pharmacy about getting the Tdap (tetanus, diptheria and pertussis) vaccine.      --Check with your pharmacy about getting the RSV vaccine this fall     --Make appointment to see the dentist     --Make appointment to see the eye doctor

## 2025-08-13 RX ORDER — TELMISARTAN 40 MG/1
40 TABLET ORAL DAILY
Qty: 30 TABLET | Refills: 0 | Status: SHIPPED | OUTPATIENT
Start: 2025-08-13